# Patient Record
Sex: MALE | Race: WHITE
[De-identification: names, ages, dates, MRNs, and addresses within clinical notes are randomized per-mention and may not be internally consistent; named-entity substitution may affect disease eponyms.]

---

## 2017-09-21 NOTE — EDM.PDOC
ED HPI GENERAL MEDICAL PROBLEM





- General


Chief Complaint: General


Stated Complaint: restless legs


Time Seen by Provider: 09/21/17 02:45


Source of Information: Reports: Patient, Family


History Limitations: Reports: No Limitations





- History of Present Illness


INITIAL COMMENTS - FREE TEXT/NARRATIVE: 





67 YO WM presents to ER with history of chronic restless leg syndrome 

exacerbation. Pt was seen at pain clinic in [place] yesterday and had his 

oxycontin decreased from 40mg PO BID to 10mg TID and started on a new 

medication which pt and family haven't filled due to pharmacy being closed. Pt 

had prescription sent electronically and is unable to tell me the name of 

medication. Pt reports for the last 3 hours he has been unable to sleep or keep 

his legs still. At time of interview pt and family state his muscle relaxers 

must have "kicked in" because he is finally comfortable and is no longer 

requesting anything for his discomfort. Pt reports he is ready to go home to 

rest. Pt denies any complaints at this time. 


Duration: Chronic


Location: Reports: Back, Lower Extremity, Left, Lower Extremity, Right


Quality: Reports: Ache


Improves with: Reports: Movement


Worsens with: Reports: Rest


Associated Symptoms: Reports: No Other Symptoms


Treatments PTA: Reports: Other (see below)


  ** Lower Leg


Pain Score (Numeric/FACES): 6





- Related Data


 Allergies











Allergy/AdvReac Type Severity Reaction Status Date / Time


 


adhesive Allergy  Rash Verified 09/21/17 02:32


 


bacitracin Allergy  Rash Verified 09/21/17 02:32





[From Neosporin     





([id number])]     


 


bacitracin zinc Allergy  Rash Verified 09/21/17 02:32





[From Neosporin     





([id number])]     


 


neomycin sulfate Allergy  Rash Verified 09/21/17 02:32





[From Neosporin     





([id number])]     


 


polymyxin B Allergy  Rash Verified 09/21/17 02:32





[From Neosporin     





([id number])]     











Home Meds: 


 Home Meds





Acetaminophen [Tylenol] 1 - 2 tab PO Q4H PRN 08/27/15 [History]


Aspirin [Halfprin] 81 mg PO BID 08/27/15 [History]


Celecoxib [CeleBREX] 100 mg PO BID 08/27/15 [History]


DULoxetine [Cymbalta] 30 mg PO ACBREAKFAST 08/27/15 [History]


DULoxetine [Cymbalta] 60 mg PO BEDTIME 08/27/15 [History]


Lisinopril [Prinivil] 2 tab PO DAILY 08/27/15 [History]


Multivitamin with Minerals [Multiple Vitamin] 1 tab PO DAILY 08/27/15 [History]


Polyethylene Glycol 3350 [MiraLAX] 17 gm PO DAILY PRN 08/27/15 [History]


Ranitidine [Zantac] 150 mg PO BID 08/27/15 [History]


oxyCODONE ER [OxyCONTIN] 80 mg PO BID 08/27/15 [History]


tiZANidine [Zanaflex] 4 mg PO Q4H PRN 08/27/15 [History]


traZODone 100 mg PO BEDTIME 08/27/15 [History]











ED ROS GENERAL





- Review of Systems


Review Of Systems: See Below


Constitutional: Reports: No Symptoms


HEENT: Reports: No Symptoms


Respiratory: Reports: No Symptoms


Cardiovascular: Reports: No Symptoms


Endocrine: Reports: No Symptoms


GI/Abdominal: Reports: No Symptoms


: Reports: No Symptoms


Musculoskeletal: Reports: Back Pain, Leg Pain


Skin: Reports: No Symptoms


Neurological: Reports: No Symptoms


Psychiatric: Reports: No Symptoms


Hematologic/Lymphatic: Reports: No Symptoms


Immunologic: Reports: No Symptoms





ED EXAM, GENERAL





- Physical Exam


Exam: See Below


Exam Limited By: No Limitations


General Appearance: Alert, WD/WN, No Apparent Distress


Head: Atraumatic, Normocephalic


Neck: Normal Inspection, Supple, Non-Tender, Full Range of Motion


Respiratory/Chest: No Respiratory Distress, Lungs Clear, Normal Breath Sounds, 

No Accessory Muscle Use, Chest Non-Tender


Cardiovascular: Normal Peripheral Pulses, Regular Rate, Rhythm, No Edema, No 

Gallop, No JVD, No Murmur, No Rub


GI/Abdominal: Normal Bowel Sounds, Soft, Non-Tender, No Organomegaly, No 

Distention, No Abnormal Bruit, No Mass


Back Exam: Full Range of Motion, Muscle Spasm


Extremities: Normal Range of Motion, Non-Tender, No Pedal Edema, Normal 

Capillary Refill, Leg Pain


Neurological: Alert, Oriented, CN II-XII Intact, Normal Cognition, Normal Gait, 

Normal Reflexes, No Motor/Sensory Deficits


Psychiatric: Normal Affect, Normal Mood


Skin Exam: Warm, Dry, Intact, Normal Color, No Rash


Lymphatic: No Adenopathy





Course





- Vital Signs


Last Recorded V/S: 





 Last Vital Signs











Temp  36.3 C   09/21/17 02:39


 


Pulse  49 L  09/21/17 02:39


 


Resp  16   09/21/17 02:39


 


BP  111/61   09/21/17 02:39


 


Pulse Ox  95   09/21/17 02:39














Departure





- Departure


Time of Disposition: 03:02


Disposition: Home, Self-Care 01


Condition: Good


Clinical Impression: 


 Restless leg syndrome, controlled








- Discharge Information


Instructions:  Restless Legs Syndrome


Referrals: 


Merlyn Mota MD [Physician] - 





- Assessment/Plan


Assessment:: 





1. Chronic Restless Leg Syndrome


Plan: 





1. Discharge home


2. continue current pain mediciation regime


3. return to ER for worsening symptoms

## 2017-10-11 NOTE — EDM.PDOC
ED HPI GENERAL MEDICAL PROBLEM





- General


Stated Complaint: vomiting


Time Seen by Provider: 10/11/17 17:02


Source of Information: Reports: Patient, Family (wife)


History Limitations: Reports: No Limitations





- History of Present Illness


INITIAL COMMENTS - FREE TEXT/NARRATIVE: 





Patient presents with vomiting for 15-18 hours; at least 10 times.  He hasn't 

eaten or drank anything today.  He isn't sure what is causing it and doesn't 

get sick like this very often.  Other than generalized upset stomach he isn't 

having any pain.  He smokes 1 ppd.  He says this started during the night some 

time.  He hasn't taken his metoprolol today he says.


  ** Right Abdomen


Pain Score (Numeric/FACES): 8





- Related Data


 Allergies











Allergy/AdvReac Type Severity Reaction Status Date / Time


 


adhesive Allergy  Rash Verified 09/21/17 02:32


 


bacitracin Allergy  Rash Verified 09/21/17 02:32





[From Neosporin     





([id number])]     


 


bacitracin zinc Allergy  Rash Verified 09/21/17 02:32





[From Neosporin     





([id number])]     


 


neomycin sulfate Allergy  Rash Verified 09/21/17 02:32





[From Neosporin     





([id number])]     


 


polymyxin B Allergy  Rash Verified 09/21/17 02:32





[From Neosporin     





([id number])]     











Home Meds: 


 Home Meds





Acetaminophen [Tylenol] 1 - 2 tab PO Q4H PRN 08/27/15 [History]


Aspirin [Halfprin] 81 mg PO BID 08/27/15 [History]


Celecoxib [CeleBREX] 100 mg PO BID 08/27/15 [History]


DULoxetine [Cymbalta] 30 mg PO ACBREAKFAST 08/27/15 [History]


DULoxetine [Cymbalta] 60 mg PO BEDTIME 08/27/15 [History]


Multivitamin with Minerals [Multiple Vitamin] 1 tab PO DAILY 08/27/15 [History]


Polyethylene Glycol 3350 [MiraLAX] 17 gm PO DAILY PRN 08/27/15 [History]


Ranitidine [Zantac] 150 mg PO BID 08/27/15 [History]


tiZANidine [Zanaflex] 4 mg PO Q4H PRN 08/27/15 [History]


traZODone 100 mg PO BEDTIME 08/27/15 [History]


Metoprolol Succinate [Toprol Xl] 50 mg PO BID 10/11/17 [History]


oxyCODONE ER [OxyCONTIN] 10 mg PO TID 10/11/17 [History]











Past Medical History


HEENT History: Reports: Cataract, Hard of Hearing, Impaired Vision, Other (See 

Below)


Other HEENT History: ocular muscular dystrophy


Cardiovascular History: Reports: Hypertension


Respiratory History: Reports: Pneumonia, Recurrent, Other (See Below)


Other Respiratory History: heavy smoker


Gastrointestinal History: Reports: Chronic Constipation, GERD, Other (See Below)


Other Gastrointestinal History: ulcer


Musculoskeletal History: Reports: Back Pain, Chronic, Muscular Dystrophy


Psychiatric History: Reports: Anxiety, Depression





- Infectious Disease History


Infectious Disease History: Reports: Measles, Mumps





- Past Surgical History


GI Surgical History: Reports: Nissen Fundoplication


Neurological Surgical History: Reports: Discectomy


Dermatological Surgical History: Reports: Skin Graft





Social & Family History





- Tobacco Use


Smoking Status *Q: Current Every Day Smoker


Years of Tobacco use: 50


Packs/Tins Daily: 1





- Caffeine Use


Caffeine Use: Reports: None





- Recreational Drug Use


Recreational Drug Use: No





ED ROS GENERAL





- Review of Systems


Review Of Systems: See Below


Constitutional: Denies: Fever, Chills


HEENT: Denies: Throat Pain, Vision Change


Respiratory: Denies: Shortness of Breath


Cardiovascular: Denies: Chest Pain, Lightheadedness, Syncope


GI/Abdominal: Reports: Nausea, Vomiting.  Denies: Constipation, Diarrhea, 

Hematemesis, Hematochezia


: Denies: Dysuria


Musculoskeletal: Reports: No Symptoms


Skin: Denies: Cyanosis, Jaundice, Mottled, Pallor, Diaphoresis


Neurological: Denies: Confusion, Dizziness, Headache, Seizure, Syncope


Psychiatric: Denies: Agitation, Anxiety, Confusion





ED EXAM, GI/ABD





- Physical Exam


Exam: See Below


Exam Limited By: No Limitations


General Appearance: Alert, WD/WN, No Apparent Distress


Eyes: Bilateral: Normal Appearance, EOMI


Ears: Normal External Exam, Hearing Grossly Normal


Nose: Normal Inspection, No Blood


Throat/Mouth: Normal Lips, Normal Voice, No Airway Compromise, Other (a little 

dry)


Head: Atraumatic, Normocephalic


Neck: Normal Inspection, Supple, Non-Tender, Full Range of Motion.  No: Carotid 

Bruit


Respiratory/Chest: No Respiratory Distress, Lungs Clear, Normal Breath Sounds, 

No Accessory Muscle Use


Cardiovascular: Regular Rate, Rhythm, No Murmur, Other (pedal pulses a little 

weak)


GI/Abdominal Exam: Normal Bowel Sounds, Soft, Non-Tender, No Organomegaly, No 

Distention


Back Exam: Normal Inspection, Full Range of Motion.  No: CVA Tenderness (L), 

CVA Tenderness (R)


Extremities: Normal Inspection, Normal Range of Motion, Non-Tender, No Pedal 

Edema


Neurological: Alert, Oriented, Normal Cognition, No Motor/Sensory Deficits


Psychiatric: Normal Affect, Normal Mood


Skin Exam: Warm, Dry, Intact, No Rash, Pallor (mild subjectively)





Course





- Vital Signs


Last Recorded V/S: 


 Last Vital Signs











Temp  97.9 F   10/11/17 17:41


 


Pulse  46 L  10/11/17 17:41


 


Resp  18   10/11/17 17:41


 


BP  233/88 H  10/11/17 17:55


 


Pulse Ox  95   10/11/17 17:41














- Orders/Labs/Meds


Orders: 


 Active Orders 24 hr











 Category Date Time Status


 


 Patient Status Manage Transfer [TRANSFER] Routine ADT  10/11/17 21:12 Ordered


 


 Patient Status [ADT] Routine ADT  10/11/17 21:11 Ordered


 


 EKG Documentation Completion [RC] ASDIRECTED Care  10/11/17 21:11 Active


 


 Abdomen Pelvis w Cont [CT] Stat Exams  10/11/17 18:35 Taken


 


 Ondansetron [Zofran ODT] Med  10/11/17 20:01 Active





 4 mg PO Q8H PRN   


 


 Sodium Chloride 0.9% [Normal Saline] Med  10/11/17 19:30 Active





 50 ml FLUSH ASDIRECTED   


 


 Tamsulosin [Flomax] Med  10/11/17 20:15 Active





 0.4 mg PO BIDPC   


 


 oxyCODONE Med  10/11/17 20:03 Active





 10 mg PO Q6H PRN   


 


 EKG 12 Lead [EK] Routine Ther  10/11/17 21:10 Ordered








 Medication Orders





Ondansetron HCl (Zofran Odt)  4 mg PO Q8H PRN


   PRN Reason: Nausea/Vomiting


Oxycodone HCl (Oxycodone)  10 mg PO Q6H PRN


   PRN Reason: Pain


Sodium Chloride (Normal Saline)  50 ml FLUSH ASDIRECTED CARLOS


Tamsulosin HCl (Flomax)  0.4 mg PO BIDPC CARLOS


   Stop: 10/13/17 09:01








Labs: 


 Laboratory Tests











  10/11/17 10/11/17 10/11/17 Range/Units





  17:15 17:15 17:25 


 


WBC  10.3 H    (5.0-10.0)  10^3/uL


 


RBC  5.24    (4.50-6.00)  10^6/uL


 


Hgb  15.4    (13.0-17.0)  g/dL


 


Hct  49.2    (40.0-52.0)  %


 


MCV  93.8 H    (82.0-92.0)  fL


 


MCH  29.4    (27.0-31.0)  pg


 


MCHC  31.3 L    (32.0-36.0)  g/dL


 


RDW  14.0    (11.5-14.5)  %


 


Plt Count  229    (150-300)  10^3/uL


 


MPV  7.9    (7.4-10.4)  fL


 


Neut % (Auto)  85.5 H    (50.0-70.0)  %


 


Lymph % (Auto)  10.9 L    (20.0-40.0)  %


 


Mono % (Auto)  3.4    (2.0-8.0)  %


 


Eos % (Auto)  0.2 L    (1.0-3.0)  %


 


Baso % (Auto)  0.0    (0.0-1.0)  %


 


Neut # (Auto)  8.8 H    (2.5-7.0)  10^3/uL


 


Lymph # (Auto)  1.1    (1.0-4.0)  10^3/uL


 


Mono # (Auto)  0.4    (0.1-0.8)  10^3/uL


 


Eos # (Auto)  0.0 L    (0.1-0.3)  10^3/uL


 


Baso # (Auto)  0.0    (0.0-0.1)  10^3/uL


 


Sodium   138   (136-145)  mmol/L


 


Potassium   4.6   (3.3-5.3)  mmol/L


 


Chloride   100   ()  mmol/L


 


Carbon Dioxide   25.7   (21.0-32.0)  mmol/L


 


BUN   22   (6-25)  mg/dL


 


Creatinine   1.26 H   (0.51-1.17)  mg/dL


 


Est Cr Clr Drug Dosing   62.05   mL/min


 


Estimated GFR (MDRD)   57   mL/min


 


Glucose   156 H   ()  mg/dL


 


Calcium   9.6   (8.7-10.3)  mg/dL


 


Total Bilirubin   0.5   (0.2-1.0)  mg/dL


 


AST   16   (15-37)  U/L


 


ALT   18   (12-78)  U/L


 


Alkaline Phosphatase   92   ()  IU/L


 


Total Protein   8.4 H   (6.4-8.2)  g/dL


 


Albumin   3.84   (3.00-4.80)  g/dL


 


Specimen Type    Urinvoid  


 


Urine Color    Yellow  (YELLOW)  


 


Urine Appearance    Clear  (CLEAR)  


 


Urine pH    7.5  (5.0-9.0)  


 


Ur Specific Gravity    1.020  (1.005-1.030)  


 


Urine Protein    100 H  (NEGATIVE)  mg/dL


 


Urine Glucose (UA)    100 H  (NEGATIVE)  mg/dL


 


Urine Ketones    Negative  (NEGATIVE)  mg/dL


 


Urine Occult Blood    Small H  (NEGATIVE)  


 


Urine Nitrite    Negative  (NEGATIVE)  


 


Urine Bilirubin    Negative  (NEGATIVE)  


 


Urine Urobilinogen    0.2  (0.2-1.0)  E.U./dL


 


Ur Leukocyte Esterase    Negative  (NEGATIVE)  


 


Urine RBC     H  /HPF


 


Urine WBC    0-5  /HPF


 


Ur Epithelial Cells    Few  /LPF


 


Urine Bacteria    Few  (NONE TO FEW)  /HPF











Meds: 


Medications











Generic Name Dose Route Start Last Admin





  Trade Name Freq  PRN Reason Stop Dose Admin


 


Ondansetron HCl  4 mg  10/11/17 20:01  





  Zofran Odt  PO   





  Q8H PRN   





  Nausea/Vomiting   


 


Oxycodone HCl  10 mg  10/11/17 20:03  





  Oxycodone  PO   





  Q6H PRN   





  Pain   


 


Sodium Chloride  50 ml  10/11/17 19:30  





  Normal Saline  FLUSH   





  ASDIRECTED CARLOS   


 


Tamsulosin HCl  0.4 mg  10/11/17 20:15  





  Flomax  PO  10/13/17 09:01  





  BIDPC CARLOS   














Discontinued Medications














Generic Name Dose Route Start Last Admin





  Trade Name Freq  PRN Reason Stop Dose Admin


 


Clonidine HCl  0.2 mg  10/11/17 20:38  





  Catapres  PO  10/11/17 20:39  





  ONETIME ONE   


 


Hydromorphone HCl  0.5 mg  10/11/17 17:48  10/11/17 17:58





  Dilaudid  IVPUSH  10/11/17 17:49  0.5 mg





  ONETIME ONE   Administration


 


Hydromorphone HCl  1 mg  10/11/17 18:34  10/11/17 18:56





  Dilaudid  IVPUSH  10/11/17 18:35  1 mg





  ONETIME ONE   Administration


 


Sodium Chloride  1,000 mls @ 999 mls/hr  10/11/17 17:13  10/11/17 17:30





  Normal Saline  IV  10/11/17 18:13  999 mls/hr





  .BOLUS ONE   Administration


 


Iopamidol  75 ml  10/11/17 19:18  





  Isovue-300 (61%)  IV  10/11/17 19:19  





  ONETIME ONE   


 


Ondansetron HCl  4 mg  10/11/17 17:13  10/11/17 17:30





  Zofran  IVPUSH  10/11/17 17:14  4 mg





  ONETIME ONE   Administration


 


Ondansetron HCl  8 mg  10/11/17 19:58  





  Zofran  IVPUSH  10/11/17 19:59  





  ONETIME ONE   


 


Oxycodone HCl  Confirm  10/11/17 20:11  





  Oxycodone  Administered  10/11/17 20:12  





  Dose   





  40 mg   





  .ROUTE   





  .STK-MED ONE   


 


Tamsulosin HCl  0.4 mg  10/11/17 19:58  





  Flomax  PO  10/11/17 19:59  





  ONETIME ONE   


 


Tamsulosin HCl  Confirm  10/11/17 20:10  





  Flomax  Administered  10/11/17 20:11  





  Dose   





  0.4 mg   





  .ROUTE   





  .STK-MED ONE   














- Re-Assessments/Exams


Free Text/Narrative Re-Assessment/Exam: 





10/11/17 17:49


Pt has a pain in LLQ that is getting more and more uncomfortable.  He thinks it 

is from the vomiting and retching.  He takes Celebrex so cannot have Toradol; 

will give Dilaudid.


10/11/17 18:36


The pain subsided briefly with the 0.5 mg Dilaudid but is back.  BP is down a 

little but still high.  Hoping to get that down with pain control initially.  

Will give Dilaudid 1 mg and get a CT.


10/11/17 18:47


BP slowly coming down; was 244, then 233 now 220 systolically.


10/11/17 20:08


CT shows two stones in distal left ureter measuring 2 mm and 3-4 mm per report.

  Patient is still rating pain at 7/10 down from a high of 8/10.  He appears 

much more comfortable than he did when he rated pain at 8/10.


10/11/17 20:39


BP is down to 202/105.  Will give Clonidine rather than his metoprolol with his 

bradycardia.


10/11/17 21:19


Discussed case with Dr. Maldonado and we decided to keep pt for observation so 

that he can be monitored with his bradycardia, uncontrolled hypertension and 

the uncontrolled kidney stone pain (although the patient appears quite 

comfortable after the Dilaudid, he still says it feels "sore" there and rates 

it at 7).  The Flomax and Zofran were also given as well as IR oxycodone 10 mg 

po.  Patient stable on admission.





Departure





- Departure


Time of Disposition: 21:17


Disposition: Refer to Observation


Condition: Good


Clinical Impression: 


 Calculus of distal left ureter, Bradycardia with 41-50 beats per minute





Hypertension


Qualifiers:


 Hypertension type: unspecified Qualified Code(s): I10 - Essential (primary) 

hypertension








- Discharge Information


Referrals: 


Merlyn Mota MD [Primary Care Provider] - 





- My Orders


Last 24 Hours: 


My Active Orders





10/11/17 18:35


Abdomen Pelvis w Cont [CT] Stat 





10/11/17 19:30


Sodium Chloride 0.9% [Normal Saline]   50 ml FLUSH ASDIRECTED 





10/11/17 20:01


Ondansetron [Zofran ODT]   4 mg PO Q8H PRN 





10/11/17 20:03


oxyCODONE   10 mg PO Q6H PRN 





10/11/17 20:15


Tamsulosin [Flomax]   0.4 mg PO BIDPC 





10/11/17 21:10


EKG 12 Lead [EK] Routine 





10/11/17 21:11


Patient Status [ADT] Routine 


EKG Documentation Completion [RC] ASDIRECTED 





10/11/17 21:12


Patient Status Manage Transfer [TRANSFER] Routine 














- Assessment/Plan


Last 24 Hours: 


My Active Orders





10/11/17 18:35


Abdomen Pelvis w Cont [CT] Stat 





10/11/17 19:30


Sodium Chloride 0.9% [Normal Saline]   50 ml FLUSH ASDIRECTED 





10/11/17 20:01


Ondansetron [Zofran ODT]   4 mg PO Q8H PRN 





10/11/17 20:03


oxyCODONE   10 mg PO Q6H PRN 





10/11/17 20:15


Tamsulosin [Flomax]   0.4 mg PO BIDPC 





10/11/17 21:10


EKG 12 Lead [EK] Routine 





10/11/17 21:11


Patient Status [ADT] Routine 


EKG Documentation Completion [RC] ASDIRECTED 





10/11/17 21:12


Patient Status Manage Transfer [TRANSFER] Routine

## 2017-10-12 NOTE — PCM.HP
H&P History of Present Illness





- General


Date of Service: 10/12/17


Admit Problem/Dx: 


Renal calculi, hypertensive urgency, bradycardia


Source of Information: Patient, Old Records


History Limitations: Reports: No Limitations





- History of Present Illness


Initial Comments - Free Text/Narative: 


Mr. Lee reported to the ED last evening following recurrent emesis for 15

-18 hours prior to arrival at [facility]. Along with this, he complained of left 

abdominal and side pain, anorexia, and generalized malaise. He denied having 

any headache, chest pain, or shortness of breath. He had been taking his home 

medications as prescribed, including the recently increased dose of metoprolol 

succinate 100mg BID, lisinopril 40mg daily, and several medications for chronic 

pain for which he is followed at [facility]. 





ED COURSE: In the ED on the evening of 10/11/17, vital signs were notable for a 

BP of 233/88 and pulse of 46. Work-up performed showed mild leukocytosis, 

mildly elevated creatinine, hematuria, and CT abdomen/pelvis with two renal 

calculi in the distal left ureter measuring 2mm and 3-4mm, mild to moderate 

left hydrouretonephrosis, and other chronic findings. Despite IV narcotics with 

some improvement in pain control and administration of clonidine, he continued 

to have ongoing pain he rated at 7/10, elevated BPs with SBPs >180, and 

bradycardia in the 40s. I was consulted by ED provider Tyrell Taylor PA-C, and 

after discussion, had him obtain an EKG which showed sinus bradycardia. Due to 

ongoing pain likely related to renal calculi, hypertensive urgency, and 

bradycardia, agreed to admit him for observation for VS and cardiac monitoring, 

breakthrough pain management with IV pain medications, and IVF.





This morning, Mr. Lee reports improvement in pain with use of IV 

hydromorphone, but continues to have left abdominal and side pain. Has yet to 

pass any stones. Nausea improved, but poor appetite. No specific aggravating 

factors for pain, but eating has made his nausea worse. Had a fever during the 

night, but doesn't have any current fever or chills. Denies other symptoms, per 

ROS.





  ** Right Abdomen


Pain Score (Numeric/FACES): 6





  ** Bilateral Leg


Pain Score (Numeric/FACES): 7





  ** Left Back


Pain Score (Numeric/FACES): 6





- Related Data


Allergies/Adverse Reactions: 


 Allergies











Allergy/AdvReac Type Severity Reaction Status Date / Time


 


adhesive Allergy  Rash Verified 10/11/17 22:25


 


bacitracin Allergy  Rash Verified 10/11/17 22:25





[From Neosporin     





([id number])]     


 


bacitracin zinc Allergy  Rash Verified 10/11/17 22:25





[From Neosporin     





([id number])]     


 


neomycin sulfate Allergy  Rash Verified 10/11/17 22:25





[From Neosporin     





([id number])]     


 


polymyxin B Allergy  Rash Verified 10/11/17 22:25





[From Neosporin     





([id number])]     











Home Medications: 


 Home Meds





Acetaminophen [Tylenol] 325 - 650 mg PO Q4H PRN 08/27/15 [History]


Aspirin [Halfprin] 81 mg PO BID 08/27/15 [History]


Celecoxib [CeleBREX] 100 mg PO BID 08/27/15 [History]


DULoxetine [Cymbalta] 30 mg PO ACBREAKFAST 08/27/15 [History]


DULoxetine [Cymbalta] 60 mg PO BEDTIME 08/27/15 [History]


Multivitamin with Minerals [Multiple Vitamin] 1 tab PO DAILY 08/27/15 [History]


Polyethylene Glycol 3350 [MiraLAX] 17 gm PO DAILY PRN 08/27/15 [History]


Ranitidine [Zantac] 150 mg PO BID 08/27/15 [History]


tiZANidine [Zanaflex] 4 - 8 mg PO Q4H PRN 08/27/15 [History]


traZODone 100 mg PO BEDTIME 08/27/15 [History]


oxyCODONE ER [OxyCONTIN] 10 mg PO TID 10/11/17 [History]


Gabapentin [Neurontin] 200 mg PO BEDTIME 10/12/17 [History]


Lisinopril 40 mg PO DAILY 10/12/17 [History]


Metoprolol Succinate [Toprol Xl] 50 mg PO BID #0 10/12/17 [Rx]


Pramipexole Di-HCl [Mirapex] 0.125 - 0.25 mg PO BEDTIME 10/12/17 [History]


Tamsulosin [Flomax] 0.4 mg PO DAILY #7 cap.er 10/12/17 [Rx]


diphenhydrAMINE HCl [Benadryl] 25 mg PO TID 10/12/17 [History]


oxyCODONE 5 - 10 mg PO Q4H PRN #20 tablet 10/12/17 [Rx]











Past Medical History


HEENT History: Reports: Cataract, Hard of Hearing, Impaired Vision, Other (See 

Below)


Other HEENT History: ocular muscular dystrophy


Cardiovascular History: Reports: Hypertension


Respiratory History: Reports: Pneumonia, Recurrent, Other (See Below)


Other Respiratory History: heavy smoker


Gastrointestinal History: Reports: Chronic Constipation, GERD, Other (See Below)


Other Gastrointestinal History: ulcer


Musculoskeletal History: Reports: Back Pain, Chronic, Muscular Dystrophy


Neurological History: Reports: None


Psychiatric History: Reports: Anxiety, Depression





- Infectious Disease History


Infectious Disease History: Reports: Measles, Mumps





- Past Surgical History


HEENT Surgical History: Reports: None


Cardiovascular Surgical History: Reports: None


GI Surgical History: Reports: Appendectomy, Cholecystectomy, Colonoscopy, 

Nissen Fundoplication


Neurological Surgical History: Reports: Discectomy


Dermatological Surgical History: Reports: Skin Graft





Social & Family History





- Family History


Family Medical History: Noncontributory





- Tobacco Use


Smoking Status *Q: Current Every Day Smoker


Years of Tobacco use: 50


Packs/Tins Daily: 1


Used Tobacco, but Quit: No


Second Hand Smoke Exposure: Yes





- Caffeine Use


Caffeine Use: Reports: None





- Recreational Drug Use


Recreational Drug Use: No





H&P Review of Systems





- Review of Systems:


Review Of Systems: See Below


General: Reports: Fever (overnight), Malaise, Weakness, Fatigue, Decreased 

Appetite


HEENT: Denies: Headaches, Sinus Congestion, Sore Throat, Visual Changes


Pulmonary: Denies: Shortness of Breath, Wheezing, Cough


Cardiovascular: Reports: Lightheadedness.  Denies: Chest Pain, Palpitations, 

Syncope


Gastrointestinal: Reports: Abdominal Pain, Anorexia, Nausea.  Denies: Black 

Stool, Bloody Stool, Constipation, Diarrhea


Genitourinary: Denies: Dysuria, Frequency, Burning, Pain, Urgency, Hematuria


Musculoskeletal: Reports: Back Pain (chronic low back pain), Other


Skin: Denies: Rash


Psychiatric: Denies: Confusion, Depression, Anxiety


Neurological: Reports: Weakness (generalized).  Denies: Dizziness, Headache





Exam





- Exam


Exam: See Below





- Vital Signs


Vital Signs: 


 Last Vital Signs











Temp  37.2 C   10/12/17 14:15


 


Pulse  56 L  10/12/17 14:15


 


Resp  16   10/12/17 14:15


 


BP  174/82 H  10/12/17 14:15


 


Pulse Ox  95   10/12/17 14:15











Weight: 79.549 kg





- Exam


Physical Exam Comments:: 


GENERAL: Uncomfortable-appearing elderly male appearing older than stated age 

in no acute distress.


HEENT: Normocephalic, atraumatic.  Conjunctiva clear.  Nares patent without 

discharge.  Mucous membranes moist, posterior pharynx unremarkable.


NECK: Supple, no masses.


CV: Bradycardia, regular rhythm, no murmurs, rubs, or gallops.  2+ radial 

pulses.


PULMONARY: Normal effort, clear to auscultation bilaterally, no wheezes, rales, 

or rhonchi.


ABDOMEN: Positive bowel sounds, soft, left mid-abdominal, side, and flank 

tenderness to palpation and percussion, nondistended.


EXTREMITIES: No edema, cyanosis, or clubbing.


MUSCULOSKELETAL: Moves all extremities well.


NEUROLOGICAL: No obvious deficits.


DERMATOLOGIC: No rashes or suspicious lesions in exposed areas.


PSYCHIATRIC: Alert, interactive, appropriate affect.





- Patient Data


Lab Results Last 24 hrs: 


 Laboratory Results - last 24 hr











  10/12/17 10/12/17 Range/Units





  08:58 08:58 


 


WBC  11.2 H   (5.0-10.0)  10^3/uL


 


RBC  4.83   (4.50-6.00)  10^6/uL


 


Hgb  14.9   (13.0-17.0)  g/dL


 


Hct  45.6   (40.0-52.0)  %


 


MCV  94.4 H   (82.0-92.0)  fL


 


MCH  30.8   (27.0-31.0)  pg


 


MCHC  32.7   (32.0-36.0)  g/dL


 


RDW  13.5   (11.5-14.5)  %


 


Plt Count  211   (150-300)  10^3/uL


 


MPV  7.8   (7.4-10.4)  fL


 


Neut % (Auto)  79.7 H   (50.0-70.0)  %


 


Lymph % (Auto)  10.9 L   (20.0-40.0)  %


 


Mono % (Auto)  9.3 H   (2.0-8.0)  %


 


Eos % (Auto)  0.1 L   (1.0-3.0)  %


 


Baso % (Auto)  0.0   (0.0-1.0)  %


 


Neut # (Auto)  9.0 H   (2.5-7.0)  10^3/uL


 


Lymph # (Auto)  1.2   (1.0-4.0)  10^3/uL


 


Mono # (Auto)  1.0 H   (0.1-0.8)  10^3/uL


 


Eos # (Auto)  0.0 L   (0.1-0.3)  10^3/uL


 


Baso # (Auto)  0.0   (0.0-0.1)  10^3/uL


 


Sodium   137  (136-145)  mmol/L


 


Potassium   4.0  (3.3-5.3)  mmol/L


 


Chloride   102  ()  mmol/L


 


Carbon Dioxide   23.6  (21.0-32.0)  mmol/L


 


BUN   21  (6-25)  mg/dL


 


Creatinine   1.29 H  (0.51-1.17)  mg/dL


 


Est Cr Clr Drug Dosing   62.52  mL/min


 


Estimated GFR (MDRD)   56  mL/min


 


Glucose   123 H  ()  mg/dL


 


Calcium   8.7  (8.7-10.3)  mg/dL


 


C-Reactive Protein   2.3 H  (0.0-0.9)  mg/dL











Result Diagrams: 


 10/12/17 08:58





 10/12/17 08:58





*Q Meaningful Use (ADM)





- VTE *Q


VTE Criteria *Q: 








- Stroke *Q


Stroke Criteria *Q: 








- AMI *Q


AMI Criteria *Q: 





Problem List Initiated/Reviewed/Updated: Yes


Orders Last 24hrs: 


 Active Orders 24 hr











 Category Date Time Status


 


 Patient Status [ADT] Routine ADT  10/11/17 22:20 Ordered


 


 Intake and Output [RC] 1400,2200,0600 Care  10/11/17 22:22 Active


 


 Notify Provider Vital Signs [RC] ASDIRECTED Care  10/11/17 22:22 Active


 


 Oxygen Therapy [RC] ASDIRECTED Care  10/11/17 22:29 Active


 


 Ready for Discharge [RC] PER UNIT ROUTINE Care  10/12/17 12:46 Inactive


 


 Strain Urine [RC] ASDIRECTED Care  10/11/17 22:27 Active


 


 Telemetry Monitoring [Cardiac Monitoring] [RC] 0300, Care  10/11/17 21:25 

Active





 0700,1100,1500,1900,2300   


 


 Up ad Lety [RC] ASDIRECTED Care  10/11/17 22:22 Active


 


 Vital Signs [RC] 0300,0700,1100,1500,1900,2300 Care  10/11/17 22:20 Active


 


 Regular Diet [DIET] Diet  10/12/17 Breakfast Active


 


 Gabapentin [Neurontin] Med  10/12/17 21:00 Active





 200 mg PO BEDTIME   


 


 HYDROmorphone [Dilaudid] Med  10/11/17 22:28 Active





 1 mg IVPUSH Q4H PRN   


 


 Labetalol [Normodyne] Med  10/11/17 22:45 Active





 See Dose Instructions  IVPUSH Q4H PRN   


 


 Lisinopril [Prinivil] Med  10/12/17 09:15 Active





 40 mg PO DAILY   


 


 Metoprolol Succinate [Toprol XL] Med  10/13/17 09:00 Active





 50 mg PO DAILY   


 


 Nicotine [Habitrol] Med  10/11/17 22:35 Active





 21 mg TRDERM DAILY PRN   


 


 Ondansetron [Zofran] Med  10/11/17 22:26 Active





 4 mg IVPUSH Q6H PRN   


 


 Pramipexole [Mirapex] Med  10/12/17 21:00 Active





 0.125 - 0.25 mg PO BEDTIME   


 


 Sodium Chloride 0.9% [Normal Saline] 1,000 ml Med  10/11/17 22:04 Active





 IV ASDIRECTED   


 


 diphenhydrAMINE [Benadryl] Med  10/12/17 09:20 Active





 25 mg PO TID PRN   


 


 oxyCODONE Med  10/12/17 09:18 Active





 5 - 10 mg PO Q4H PRN   


 


 Code Status [Resuscitation Status] Routine Resus Stat  10/12/17 09:18 Ordered








 Medication Orders





Acetaminophen (Tylenol)  325 - 650 mg PO Q4H PRN


   PRN Reason: Pain


Aspirin (Halfprin)  81 mg PO BID [facility]


   Last Admin: 10/12/17 08:32  Dose: 81 mg


Celecoxib (Celebrex)  100 mg PO BID [facility]


   Last Admin: 10/12/17 08:31  Dose: 100 mg


Diphenhydramine HCl (Benadryl)  25 mg PO TID PRN


   PRN Reason: Itching


Duloxetine HCl (Cymbalta)  30 mg PO ACBREAKFAST [facility]


   Last Admin: 10/12/17 07:51  Dose: 30 mg


Duloxetine HCl (Cymbalta)  60 mg PO BEDTIME [facility]


Gabapentin (Neurontin)  200 mg PO BEDTIME [facility]


Hydromorphone HCl (Dilaudid)  1 mg IVPUSH Q4H PRN


   PRN Reason: Pain (severe 7-10)


   Last Admin: 10/12/17 05:52  Dose: 1 mg


Sodium Chloride (Normal Saline)  1,000 mls @ 125 mls/hr IV ASDIRECTED [facility]


   Last Admin: 10/12/17 13:59  Dose: 125 mls/hr


   Infusion: 10/12/17 13:59  Dose: 125 mls/hr


   Admin: 10/12/17 06:30  Dose: 125 mls/hr


   Infusion: 10/12/17 06:28  Dose: 125 mls/hr


   Admin: 10/11/17 22:28  Dose: 125 mls/hr


Labetalol HCl (Normodyne)  0 mg IVPUSH Q4H PRN; Protocol


   PRN Reason: Other


Lisinopril (Prinivil)  40 mg PO DAILY [facility]


   Last Admin: 10/12/17 09:38  Dose: 40 mg


Metoprolol Succinate (Toprol Xl)  50 mg PO DAILY [facility]


Multivitamins/Minerals (Centrum)  1 tab PO DAILY [facility]


   Last Admin: 10/12/17 08:31  Dose: 1 tab


Nicotine (Habitrol)  21 mg TRDERM DAILY PRN


   PRN Reason: craving


Ondansetron HCl (Zofran)  4 mg IVPUSH Q6H PRN


   PRN Reason: Nausea/Vomiting


Oxycodone HCl (Oxycontin)  10 mg PO TID [facility]


   Last Admin: 10/12/17 13:59  Dose: 10 mg


   Admin: 10/12/17 08:32  Dose: 10 mg


Oxycodone HCl (Oxycodone)  5 - 10 mg PO Q4H PRN


   PRN Reason: Pain


   Last Admin: 10/12/17 12:39  Dose: 10 mg


Polyethylene Glycol (Miralax)  17 gm PO DAILY PRN


   PRN Reason: Constipation


Pramipexole Dihydrochloride (Mirapex)  0.125 - 0.25 mg PO BEDTIME [facility]


Ranitidine HCl (Zantac)  150 mg PO BID [facility]


   Last Admin: 10/12/17 08:34  Dose: 150 mg


[facility]  [facility]


   Stop: 10/13/17 09:01


   Last Admin: 10/12/17 18:49  Dose: 0.4 mg


   Admin: 10/12/17 09:38  Dose: 0.4 mg


   Admin: 10/12/17 03:17 Dose:  Not Given


Tizanidine HCl (Zanaflex)  4 mg PO Q4H PRN


   PRN Reason: Muscle Spasm


   Last Admin: 10/12/17 14:10  Dose: 4 mg


   Admin: 10/12/17 06:03  Dose: 4 mg


Trazodone HCl (Trazodone)  100 mg PO BEDTIME [facility]








Assessment/Plan Comment:: 


66yoM with history notable for HTN, GERD, chronic pain, depresison, and tobacco 

dependence admitted for pain related to renal calculi, hypertensive urgency, 

and bradycardia from the ED on the evening of 10/11/17.





# Renal calculi: Presentation of left sided pain, nausea, and anorexia 

consistent with renal colic. Two sub-4mm stones in distal left ureter without 

known passage thus far. Continue pain control with prn IV hydromorphone and 

will also start some prn oxycodone to provide longer control, in addition to 

his baseline regimen of pain medications. Continue tamsulosin and IVF. Continue 

straining urine with plan to collect stone for analysis.





# Hypertensive urgency and bradycardia: BPs severely elevated on initial 

presentation and remained with SBPs in the 200s despite clonidine. Outpatient 

charting shows inconsistent antihypertensive use and recent increase in 

metoprolol succinate to 100mg BID, likely causing bradycardia with concominant 

hypertension. Asymptomatic without evidence of end organ damage. BPs improved 

overnight to <180/110 without additional use of prn IV labetalol. Continue 

lisinopril 40mg daily. Resume metoprolol succinate at reduced dose of 50mg BID 

tomorrow. Plan to start HCTZ as an outpatient, as further acute blood pressure 

lowering in the initial 24 hours is not indicated.





# Elevated creatinine: Baseline creatinine around 1. Current creatinine 1.29, 

stable from admission at 1.26, consistent with some volume depletion and 

obstruction from renal calculi, but not diagnostic for acute kidney. Continue 

IVF and recheck tomorrow.





# Leukocytosis: Mildly increased WBC and neutrophilia with mild fever this 

morning. No symptoms of infectious process and exam nonfocal. Continue 

monitoring.





Chronic conditions:


# GERD: Continue PPI.


# Chronic low back pain: Continue home regimen for which he is followed by 

[facility].


# Insomnia: Continue trazodone. 


# Tobacco dependence. Nicotine patch prn. 





Hospitalization details:


# FEN: NS at 125cc/hr. Electrolytes normal; recheck tomorrow. Regular diet.


# PPX: Moderate risk for DVT=enoxaparin.


# Code status: Full.


# Emergency contact: Wife. 


# Disposition: Continue on observation with anticipated discharge to home 

tomorrow morning pending improved pain control and blood pressure stabilization 

without worsening renal function or evidence of infection.

## 2017-10-13 NOTE — PCM.DCSUM1
**Discharge Summary





- Hospital Course


Free Text/Narrative:: 


66yoM with history notable for HTN, GERD, chronic pain, depresison, and tobacco 

dependence admitted for pain related to renal calculi, hypertensive urgency, 

and bradycardia from the ED on the evening of 10/11/17.





He had interval improvement in flank pain and blood pressures during his stay 

and was deemed ready for discharge on 10/13/17. He will follow-up in 3 days in 

the [facility] to reassess. Follow-up recommendations underlined below in 

problem-based hospital course: 





# Renal calculi: Presentation of left sided pain, nausea, and anorexia 

consistent with renal colic. Two sub-4mm stones in distal left ureter noted on 

CT abdomen/pelvis. Tamsulosin and IVF started. Provided acute pain control with 

prn IV hydromorphone and oxycodone, in addition to his baseline regimen of pain 

medications. No stone was known to be passed, but thought to have likely made 

it into the bladder as he had improvement in his pain with no use of prn 

medications in the last 8 hours of hospitalization. Recommended continued use 

of tamsulosin and increased fluid intake until stone passage. Provided supplies 

to continue straining urine and to bring into clinic for analysis once passed. 

Likely calcium carbonate stone, of which recurrence will be minimized by 

initiation of HCTZ, primarily for BP control, as below.





# Hypertensive urgency and bradycardia: BPs severely elevated on initial 

presentation and remained with SBPs in the 200s despite clonidine. Outpatient 

charting shows inconsistent antihypertensive use and recent increase in 

metoprolol succinate to 100mg BID, likely causing bradycardia with concominant 

hypertension. Asymptomatic without evidence of end organ damage. BPs improved 

throughout stay to <180/110. Continued lisinopril 40mg daily. Resumed 

metoprolol succinate at reduced dose of 50mg BID on the day of discharge. 

Provided rx for him to start HCTZ 12.5mg the day after discharge. Ongoing 

titration of medications will be necessary to optimize BP control. 





# Elevated creatinine: Baseline creatinine around 1, though this was >2 years 

ago, and 1.29 on admission consistent with some volume depletion and 

obstruction from renal calculi, but not diagnostic for acute kidney. IVF given 

and creatinine slightly elevated further to 1.38, but without any other 

abnormalities. Recheck in 3 days. 





# Leukocytosis: Mildly increased WBC and neutrophilia on admission with mild 

fever approximately 8 hours after admission without recurrence and no symptoms 

of infectious process and exam nonfocal. WBC normalized and CRP stable on day 

of discharge. 





Chronic conditions:


# GERD: Continued PPI.


# Chronic low back pain: Continued home regimen for which he is followed by 

[facility].


# Insomnia: Continued trazodone. 


# Tobacco dependence. Nicotine patch provided. Encouraged cutting back. 





- Discharge Data


Discharge Date: 10/13/17


Discharge Disposition: Home, Self-Care 01


Condition: Good





- Patient Instructions


Diet: Heart Healthy Diet, Drink 8-10+ Glasses/Day


Activity: As Tolerated


Showering/Bathing: May Shower


Notify Provider of: Fever, Increased Pain, Nausea and/or Vomiting





- Discharge Plan


Prescriptions/Med Rec: 


Hydrochlorothiazide 12.5 mg PO DAILY #30 tablet


Tamsulosin [Flomax] 0.4 mg PO DAILY #7 cap.er


Home Medications: 


 Home Meds





Acetaminophen [Tylenol] 325 - 650 mg PO Q4H PRN 08/27/15 [History]


Aspirin [Halfprin] 81 mg PO BID 08/27/15 [History]


Celecoxib [CeleBREX] 100 mg PO BID 08/27/15 [History]


DULoxetine [Cymbalta] 30 mg PO ACBREAKFAST 08/27/15 [History]


DULoxetine [Cymbalta] 60 mg PO BEDTIME 08/27/15 [History]


Multivitamin with Minerals [Multiple Vitamin] 1 tab PO DAILY 08/27/15 [History]


Polyethylene Glycol 3350 [MiraLAX] 17 gm PO DAILY PRN 08/27/15 [History]


Ranitidine [Zantac] 150 mg PO BID 08/27/15 [History]


tiZANidine [Zanaflex] 4 - 8 mg PO Q4H PRN 08/27/15 [History]


traZODone 100 mg PO BEDTIME 08/27/15 [History]


oxyCODONE ER [OxyCONTIN] 10 mg PO TID 10/11/17 [History]


Gabapentin [Neurontin] 200 mg PO BEDTIME 10/12/17 [History]


Lisinopril 40 mg PO DAILY 10/12/17 [History]


Metoprolol Succinate [Toprol Xl] 50 mg PO BID #0 10/12/17 [Rx]


Pramipexole Di-HCl [Mirapex] 0.125 - 0.25 mg PO BEDTIME 10/12/17 [History]


Tamsulosin [Flomax] 0.4 mg PO DAILY #7 cap.er 10/12/17 [Rx]


diphenhydrAMINE HCl [Benadryl] 25 mg PO TID 10/12/17 [History]


Hydrochlorothiazide 12.5 mg PO DAILY #30 tablet 10/13/17 [Rx]








Forms:  ED Department Discharge


Referrals: 


Merlyn Mota MD [Primary Care Provider] -  (Follow-up with [facility] or 

[facility] in 3-5 days.)





- General Info


Admission Dx/Problem (Free Text: 


Renal calculi, hypertensive urgency, bradycardia


Subjective Update: 


This morning, Mr. Lee reports minimal left abdominal and side pain. He 

is at his chronic state of lower back pain unchanged from prior to 

hospitalization. Has yet to pass any stones. Nausea and anorexia resolved. No 

new concerns. Feels close to his baseline state and desires discharge to home.





Denies fever, chills, headache, chest pain, shortness of breath, lightheadedness

, dizziness, or other concerns.





- Patient Data


Vitals - Most Recent: 


 Last Vital Signs











Temp  38.1 C   10/13/17 06:17


 


Pulse  65   10/13/17 08:20


 


Resp  18   10/13/17 06:17


 


BP  162/91 H  10/13/17 08:20


 


Pulse Ox  90 L  10/13/17 09:03











Weight - Most Recent: 79.549 kg


I&O - Last 24 hours: 


 Intake & Output











 10/12/17 10/13/17 10/13/17





 22:59 06:59 14:59


 


Intake Total 1236 1354 


 


Output Total 425 600 


 


Balance 811 754 











Lab Results - Last 24 hrs: 


 Laboratory Results - last 24 hr











  10/12/17 10/13/17 10/13/17 Range/Units





  08:58 07:05 07:05 


 


WBC   7.9   (5.0-10.0)  10^3/uL


 


RBC   3.85 L   (4.50-6.00)  10^6/uL


 


Hgb   12.0 L   (13.0-17.0)  g/dL


 


Hct   36.1 L   (40.0-52.0)  %


 


MCV   94.0 H   (82.0-92.0)  fL


 


MCH   31.2 H   (27.0-31.0)  pg


 


MCHC   33.2   (32.0-36.0)  g/dL


 


RDW   13.9   (11.5-14.5)  %


 


Plt Count   146 L   (150-300)  10^3/uL


 


MPV   8.0   (7.4-10.4)  fL


 


Neut % (Auto)   67.9   (50.0-70.0)  %


 


Lymph % (Auto)   16.2 L   (20.0-40.0)  %


 


Mono % (Auto)   14.9 H   (2.0-8.0)  %


 


Eos % (Auto)   0.7 L   (1.0-3.0)  %


 


Baso % (Auto)   0.3   (0.0-1.0)  %


 


Neut # (Auto)   5.3   (2.5-7.0)  10^3/uL


 


Lymph # (Auto)   1.3   (1.0-4.0)  10^3/uL


 


Mono # (Auto)   1.2 H   (0.1-0.8)  10^3/uL


 


Eos # (Auto)   0.1   (0.1-0.3)  10^3/uL


 


Baso # (Auto)   0.0   (0.0-0.1)  10^3/uL


 


Sodium  137   139  (136-145)  mmol/L


 


Potassium  4.0   4.5  (3.3-5.3)  mmol/L


 


Chloride  102   107  ()  mmol/L


 


Carbon Dioxide  23.6   23.6  (21.0-32.0)  mmol/L


 


BUN  21   21  (6-25)  mg/dL


 


Creatinine  1.29 H   1.38 H  (0.51-1.17)  mg/dL


 


Est Cr Clr Drug Dosing  62.52   58.44  mL/min


 


Estimated GFR (MDRD)  56   51  mL/min


 


Glucose  123 H   82  ()  mg/dL


 


Calcium  8.7   8.3 L  (8.7-10.3)  mg/dL


 


C-Reactive Protein  2.3 H   2.3 H  (0.0-0.9)  mg/dL











Med Orders - Current: 


 Current Medications





Acetaminophen (Tylenol)  325 - 650 mg PO Q4H PRN


   PRN Reason: Pain


Aspirin (Halfprin)  81 mg PO BID [facility]


   Last Admin: 10/13/17 08:19 Dose:  81 mg


Celecoxib (Celebrex)  100 mg PO BID [facility]


   Last Admin: 10/13/17 08:19 Dose:  100 mg


Diphenhydramine HCl (Benadryl)  25 mg PO TID PRN


   PRN Reason: Itching


Duloxetine HCl (Cymbalta)  30 mg PO ACBREAKFAST [facility]


   Last Admin: 10/13/17 07:21 Dose:  30 mg


Duloxetine HCl (Cymbalta)  60 mg PO BEDTIME [facility]


   Last Admin: 10/12/17 20:35 Dose:  60 mg


Enoxaparin Sodium (Lovenox)  30 mg SUBCUT DAILY [facility]


   Last Admin: 10/13/17 08:20 Dose:  30 mg


Gabapentin (Neurontin)  200 mg PO BEDTIME [facility]


   Last Admin: 10/12/17 20:35 Dose:  200 mg


Hydromorphone HCl (Dilaudid)  1 mg IVPUSH Q4H PRN


   PRN Reason: Pain (severe 7-10)


   Last Admin: 10/12/17 05:52 Dose:  1 mg


Sodium Chloride (Normal Saline)  1,000 mls @ 125 mls/hr IV ASDIRECTED [facility]


   Last Admin: 10/13/17 06:02 Dose:  125 mls/hr


Labetalol HCl (Normodyne)  0 mg IVPUSH Q4H PRN; Protocol


   PRN Reason: Other


Lisinopril (Prinivil)  40 mg PO DAILY [facility]


   Last Admin: 10/13/17 08:20 Dose:  40 mg


Metoprolol Succinate (Toprol Xl)  50 mg PO DAILY [facility]


   Last Admin: 10/13/17 08:20 Dose:  50 mg


Multivitamins/Minerals (Centrum)  1 tab PO DAILY [facility]


   Last Admin: 10/13/17 08:19 Dose:  1 tab


Nicotine (Habitrol)  21 mg TRDERM DAILY PRN


   PRN Reason: craving


Ondansetron HCl (Zofran)  4 mg IVPUSH Q6H PRN


   PRN Reason: Nausea/Vomiting


Oxycodone HCl (Oxycontin)  10 mg PO TID [facility]


   Last Admin: 10/13/17 08:20 Dose:  10 mg


Oxycodone HCl (Oxycodone)  5 - 10 mg PO Q4H PRN


   PRN Reason: Pain


   Last Admin: 10/12/17 12:39 Dose:  10 mg


Polyethylene Glycol (Miralax)  17 gm PO DAILY PRN


   PRN Reason: Constipation


Pramipexole Dihydrochloride (Mirapex)  0.125 - 0.25 mg PO BEDTIME [facility]


   Last Admin: 10/12/17 20:38 Dose:  0.125 mg


Ranitidine HCl (Zantac)  150 mg PO BID [facility]


   Last Admin: 10/13/17 08:20 Dose:  150 mg


Tizanidine HCl (Zanaflex)  4 mg PO Q4H PRN


   PRN Reason: Muscle Spasm


   Last Admin: 10/12/17 14:10 Dose:  4 mg


Trazodone HCl (Trazodone)  100 mg PO BEDTIME [facility]


   Last Admin: 10/12/17 20:39 Dose:  100 mg





Discontinued Medications





Clonidine HCl (Catapres)  0.2 mg PO ONETIME ONE


   Stop: 10/11/17 20:39


   Last Admin: 10/11/17 20:45 Dose:  0.2 mg


Hydromorphone HCl (Dilaudid)  0.5 mg IVPUSH ONETIME ONE


   Stop: 10/11/17 17:49


   Last Admin: 10/11/17 17:58 Dose:  0.5 mg


Hydromorphone HCl (Dilaudid)  1 mg IVPUSH ONETIME ONE


   Stop: 10/11/17 18:35


   Last Admin: 10/11/17 18:56 Dose:  1 mg


Sodium Chloride (Normal Saline)  1,000 mls @ 999 mls/hr IV .BOLUS ONE


   Stop: 10/11/17 18:13


   Last Admin: 10/11/17 17:30 Dose:  999 mls/hr


Iopamidol (Isovue-300 (61%))  75 ml IV ONETIME ONE


   Stop: 10/11/17 19:19


   Last Admin: 10/11/17 19:22 Dose:  75 ml


Ondansetron HCl (Zofran)  4 mg IVPUSH ONETIME ONE


   Stop: 10/11/17 17:14


   Last Admin: 10/11/17 17:30 Dose:  4 mg


Ondansetron HCl (Zofran)  8 mg IVPUSH ONETIME ONE


   Stop: 10/11/17 19:59


   Last Admin: 10/11/17 20:10 Dose:  8 mg


Ondansetron HCl (Zofran Odt)  4 mg PO Q8H PRN


   PRN Reason: Nausea/Vomiting


Oxycodone HCl (Oxycodone)  10 mg PO Q6H PRN


   PRN Reason: Pain


   Last Admin: 10/11/17 20:15 Dose:  10 mg


Oxycodone HCl (Oxycodone) Confirm Administered Dose 40 mg .ROUTE .STK-MED ONE


   Stop: 10/11/17 20:12


   Last Admin: 10/12/17 03:15 Dose:  Not Given


Sodium Chloride (Normal Saline)  50 ml FLUSH ASDIRECTED [facility]


   Last Admin: 10/11/17 19:23 Dose:  50 ml


Tamsulosin HCl (Flomax)  0.4 mg PO ONETIME ONE


   Stop: 10/11/17 19:59


   Last Admin: 10/11/17 20:15 Dose:  0.4 mg


Tamsulosin HCl (Flomax)  0.4 mg PO BIDPC [facility]


   Stop: 10/13/17 09:01


   Last Admin: 10/13/17 08:19 Dose:  0.4 mg


Tamsulosin HCl (Flomax) Confirm Administered Dose 0.4 mg .ROUTE .STK-MED ONE


   Stop: 10/11/17 20:11


   Last Admin: 10/12/17 03:16 Dose:  Not Given











- Exam


Physical Findings Comments:: 


GENERAL: Well-appearing elderly male appearing older than stated age in no 

acute distress.


HEENT: Normocephalic, atraumatic.  Conjunctiva clear.  Nares patent without 

discharge.  Mucous membranes moist.


NECK: Supple, no masses.


CV: Bradycardia, regular rhythm, no murmurs, rubs, or gallops.  2+ radial 

pulses.


PULMONARY: Normal effort, clear to auscultation bilaterally, no wheezes, rales, 

or rhonchi.


ABDOMEN: Positive bowel sounds, soft, no tenderness to palpation or percussion, 

nondistended.


EXTREMITIES: No edema, cyanosis, or clubbing.


MUSCULOSKELETAL: Moves all extremities well.


NEUROLOGICAL: No obvious deficits.


DERMATOLOGIC: No rashes or suspicious lesions in exposed areas.


PSYCHIATRIC: Alert, interactive, appropriate affect.





*Q Meaningful Use (DIS)





- VTE *Q


VTE Criteria *Q: 








- Stroke *Q


Stroke Criteria *Q: 








- AMI *Q


AMI Criteria *Q:

## 2018-06-20 ENCOUNTER — HOSPITAL ENCOUNTER (EMERGENCY)
Dept: HOSPITAL 77 - KA.ED | Age: 68
Discharge: NURSING, REHAB OR LONG-TERM CARE | End: 2018-06-20
Payer: MEDICARE

## 2018-06-20 VITALS — SYSTOLIC BLOOD PRESSURE: 74 MMHG | DIASTOLIC BLOOD PRESSURE: 40 MMHG

## 2018-06-20 DIAGNOSIS — R20.0: ICD-10-CM

## 2018-06-20 DIAGNOSIS — Z88.8: ICD-10-CM

## 2018-06-20 DIAGNOSIS — K21.9: ICD-10-CM

## 2018-06-20 DIAGNOSIS — E86.0: ICD-10-CM

## 2018-06-20 DIAGNOSIS — Z79.82: ICD-10-CM

## 2018-06-20 DIAGNOSIS — Z91.048: ICD-10-CM

## 2018-06-20 DIAGNOSIS — Z79.899: ICD-10-CM

## 2018-06-20 DIAGNOSIS — I10: ICD-10-CM

## 2018-06-20 DIAGNOSIS — M62.81: ICD-10-CM

## 2018-06-20 DIAGNOSIS — I95.9: Primary | ICD-10-CM

## 2018-06-20 LAB
CHLORIDE SERPL-SCNC: 99 MMOL/L (ref 98–115)
SODIUM SERPL-SCNC: 133 MMOL/L (ref 136–145)

## 2018-06-20 PROCEDURE — 99285 EMERGENCY DEPT VISIT HI MDM: CPT

## 2018-06-20 PROCEDURE — 80048 BASIC METABOLIC PNL TOTAL CA: CPT

## 2018-06-20 PROCEDURE — 85025 COMPLETE CBC W/AUTO DIFF WBC: CPT

## 2018-06-20 PROCEDURE — 36415 COLL VENOUS BLD VENIPUNCTURE: CPT

## 2018-06-20 PROCEDURE — 96374 THER/PROPH/DIAG INJ IV PUSH: CPT

## 2018-06-20 PROCEDURE — 84484 ASSAY OF TROPONIN QUANT: CPT

## 2018-06-20 PROCEDURE — 96361 HYDRATE IV INFUSION ADD-ON: CPT

## 2018-06-20 NOTE — EDM.PDOC
ED HPI GENERAL MEDICAL PROBLEM





- General


Chief Complaint: Back Pain or Injury


Time Seen by Provider: 06/20/18 08:04


Source of Information: Reports: Patient, EMS, Family (wife)


History Limitations: Reports: No Limitations





- History of Present Illness


INITIAL COMMENTS - FREE TEXT/NARRATIVE: 





Patient presents via ambulance with hypotension and bilat leg pain.  This 

started early this morning, not sure exact time but his wife says he had been 

up in a chair awhile before she got up at 0530.  They tell me he has had leg 

pain, numbness and weakness in the past but never this bad or lasting this 

long.  He hasn't had low blood pressure before and assures me he didn't take 

more than the prescribed dose of his BP meds.  He doesn't drink much water 

though; his wife estimates about 16 ounces a day.  He denies CHF, chest pain, 

dyspnea, falling/trauma.  He normally walks with a cane but quite unsteady per 

wife.  He is a current 1 ppd smoker with 50+ year history.  BP was 55/38 when 

ambulance picked up him up and was a little better on ER arrival.


  ** Lower Back


Pain Score (Numeric/FACES): 10





- Related Data


 Allergies











Allergy/AdvReac Type Severity Reaction Status Date / Time


 


adhesive Allergy  Rash Verified 06/20/18 08:16


 


bacitracin Allergy  Rash Verified 06/20/18 08:16





[From Neosporin     





([id number])]     


 


bacitracin zinc Allergy  Rash Verified 06/20/18 08:16





[From Neosporin     





([id number])]     


 


neomycin sulfate Allergy  Rash Verified 06/20/18 08:16





[From Neosporin     





([id number])]     


 


polymyxin B Allergy  Rash Verified 06/20/18 08:16





[From Neosporin     





([id number])]     











Home Meds: 


 Home Meds





Acetaminophen [Tylenol] 325 - 650 mg PO Q4H PRN 08/27/15 [History]


Aspirin [Halfprin] 81 mg PO BID 08/27/15 [History]


Celecoxib [CeleBREX] 100 mg PO BID 08/27/15 [History]


DULoxetine [Cymbalta] 30 mg PO ACBREAKFAST 08/27/15 [History]


DULoxetine [Cymbalta] 60 mg PO BEDTIME 08/27/15 [History]


Polyethylene Glycol 3350 [MiraLAX] 17 gm PO DAILY PRN 08/27/15 [History]


Ranitidine [Zantac] 150 mg PO BID 08/27/15 [History]


tiZANidine [Zanaflex] 4 - 8 mg PO Q4H PRN 08/27/15 [History]


traZODone 100 mg PO BEDTIME 08/27/15 [History]


Gabapentin [Neurontin] 200 mg PO BEDTIME 10/12/17 [History]


Lisinopril 40 mg PO DAILY 10/12/17 [History]


Metoprolol Succinate [Toprol Xl] 50 mg PO BID #0 10/12/17 [Rx]


Pramipexole Di-HCl [Mirapex] 0.125 - 0.25 mg PO BEDTIME 10/12/17 [History]


diphenhydrAMINE HCl [Benadryl] 25 mg PO TID 10/12/17 [History]


Hydrochlorothiazide 12.5 mg PO DAILY #30 tablet 10/13/17 [Rx]


oxyCODONE 5 mg PO BID 06/20/18 [History]











Past Medical History


HEENT History: Reports: Cataract, Hard of Hearing, Impaired Vision, Other (See 

Below)


Other HEENT History: ocular muscular dystrophy


Cardiovascular History: Reports: Hypertension


Respiratory History: Reports: Pneumonia, Recurrent, Other (See Below)


Other Respiratory History: heavy smoker


Gastrointestinal History: Reports: Chronic Constipation, GERD, Other (See Below)


Other Gastrointestinal History: ulcer


Musculoskeletal History: Reports: Back Pain, Chronic, Muscular Dystrophy


Neurological History: Reports: None


Psychiatric History: Reports: Anxiety, Depression





- Infectious Disease History


Infectious Disease History: Reports: Measles, Mumps





- Past Surgical History


HEENT Surgical History: Reports: None


Cardiovascular Surgical History: Reports: None


GI Surgical History: Reports: Appendectomy, Cholecystectomy, Colonoscopy, 

Nissen Fundoplication


Neurological Surgical History: Reports: Discectomy


Dermatological Surgical History: Reports: Skin Graft





Social & Family History





- Family History


Family Medical History: Noncontributory





- Caffeine Use


Caffeine Use: Reports: None





ED ROS GENERAL





- Review of Systems


Review Of Systems: See Below


Constitutional: Reports: Weakness.  Denies: Fever, Chills


HEENT: Denies: Ear Pain, Throat Swelling


Respiratory: Denies: Shortness of Breath, Wheezing


Cardiovascular: Reports: Blood Pressure Problem (high).  Denies: Chest Pain, 

Lightheadedness, Syncope


GI/Abdominal: Denies: Abdominal Pain, Diarrhea, Nausea, Vomiting


: Denies: Dysuria, Incontinence


Musculoskeletal: Reports: Back Pain (low, chronic), Leg Pain (bilat).  Denies: 

Neck Pain, Shoulder Pain, Arm Pain, Hand Pain


Skin: Denies: Cyanosis, Jaundice, Mottled


Neurological: Reports: Numbness, Difficulty Walking.  Denies: Confusion, 

Dizziness, Headache, Trouble Speaking


Psychiatric: Denies: Agitation, Anxiety, Confusion





ED EXAM, NEURO





- Physical Exam


Exam: See Below


Exam Limited By: No Limitations


General Appearance: Alert, WD/WN, Anxious


Eye Exam: Bilateral Eye: EOMI, Normal Inspection, PERRL


Ears: Normal External Exam, Hearing Grossly Normal


Nose: Normal Inspection, No Blood


Throat/Mouth: Normal Inspection, Normal Lips, Normal Voice, No Airway Compromise


Head Exam: Atraumatic, Normocephalic


Neck: Normal Inspection, Full Range of Motion


Respiratory/Chest: No Respiratory Distress, Lungs Clear, Normal Breath Sounds, 

No Accessory Muscle Use


Cardiovascular: Regular Rate, Rhythm, No Edema, No Gallop, No Murmur, No Rub


GI/Abdominal: Normal Bowel Sounds, Soft, Non-Tender, No Organomegaly, No 

Distention


Neurological: Alert, Normal Mood/Affect, Oriented x 3, Other (numbness left 

lateral thigh; decreased sensation of right lateral thigh, bilat ankles.  

Cannot dorsiflex or plantar flex bilat; cannot raise either leg but there is 

mild effort/movement noted on right, but cannot clear table.  Re-assessment of 

this later shows improvement in leg movement bilat but he cannot raise off 

table with either leg.  )


Extremities: No Pedal Edema, Leg Pain, Limited Range of Motion (no AROM), 

Pallor.  No: Mottled, Redness


Psychiatric: Normal Affect, Normal Mood


Skin Exam: Warm, Dry, Intact, Pallor.  No: Decubitus, Diaphoretic, Ecchymosis, 

Erythema, Rash, Wound/Incision





Course





- Vital Signs


Last Recorded V/S: 


 Last Vital Signs











Temp  98.2 F   06/20/18 08:10


 


Pulse  68   06/20/18 08:10


 


Resp  13   06/20/18 08:10


 


BP  74/40 L  06/20/18 08:10


 


Pulse Ox  92 L  06/20/18 08:10














- Orders/Labs/Meds


Orders: 


 Active Orders 24 hr











 Category Date Time Status


 


 EKG Documentation Completion [RC] ASDIRECTED Care  06/20/18 08:28 Ordered


 


 Sodium Chloride 0.9% @ 999 MLS/HR (1000ml) Med  06/20/18 08:59 Ordered





 Sodium Chloride 0.9% [Normal Saline] 1,000 ml   





 IV .BOLUS   


 


 EKG 12 Lead [EK] Routine Ther  06/20/18 08:26 Ordered








 Medication Orders





Sodium Chloride (Normal Saline)  1,000 mls @ 999 mls/hr IV .BOLUS ONE


   Stop: 06/20/18 09:59


   Last Admin: 06/20/18 09:05  Dose: 999 mls/hr








Labs: 


 Laboratory Tests











  06/20/18 06/20/18 Range/Units





  08:06 08:06 


 


WBC  9.6   (5.0-10.0)  10^3/uL


 


RBC  4.05 L   (4.50-6.00)  10^6/uL


 


Hgb  12.2 L   (13.0-17.0)  g/dL


 


Hct  37.8 L   (40.0-52.0)  %


 


MCV  93.2 H   (82.0-92.0)  fL


 


MCH  30.0   (27.0-31.0)  pg


 


MCHC  32.2   (32.0-36.0)  g/dL


 


RDW  14.9 H   (11.5-14.5)  %


 


Plt Count  238  D   (150-300)  10^3/uL


 


MPV  7.6   (7.4-10.4)  fL


 


Neut % (Auto)  71.1 H   (50.0-70.0)  %


 


Lymph % (Auto)  19.8 L   (20.0-40.0)  %


 


Mono % (Auto)  7.1   (2.0-8.0)  %


 


Eos % (Auto)  1.8   (1.0-3.0)  %


 


Baso % (Auto)  0.2   (0.0-1.0)  %


 


Neut # (Auto)  6.8   (2.5-7.0)  10^3/uL


 


Lymph # (Auto)  1.9   (1.0-4.0)  10^3/uL


 


Mono # (Auto)  0.7   (0.1-0.8)  10^3/uL


 


Eos # (Auto)  0.2   (0.1-0.3)  10^3/uL


 


Baso # (Auto)  0.0   (0.0-0.1)  10^3/uL


 


Sodium   133 L  (136-145)  mmol/L


 


Potassium   5.3  (3.3-5.3)  mmol/L


 


Chloride   99  ()  mmol/L


 


Carbon Dioxide   22.0  (21.0-32.0)  mmol/L


 


BUN   44 H  (6-25)  mg/dL


 


Creatinine   2.40 H  (0.51-1.17)  mg/dL


 


Est Cr Clr Drug Dosing   30.42  mL/min


 


Estimated GFR (MDRD)   27  mL/min


 


Glucose   177 H  ()  mg/dL


 


Calcium   8.4 L  (8.7-10.3)  mg/dL


 


Troponin I   0.05  (0.00-0.070)  ng/mL











Meds: 


Medications











Generic Name Dose Route Start Last Admin





  Trade Name Freq  PRN Reason Stop Dose Admin


 


Sodium Chloride  1,000 mls @ 999 mls/hr  06/20/18 08:59  06/20/18 09:05





  Normal Saline  IV  06/20/18 09:59  999 mls/hr





  .BOLUS ONE   Administration





     





     





     





     














Discontinued Medications














Generic Name Dose Route Start Last Admin





  Trade Name Freq  PRN Reason Stop Dose Admin


 


Sodium Chloride  1,000 mls @ 999 mls/hr  06/20/18 07:55  06/20/18 08:11





  Normal Saline  IV  06/20/18 08:55  999 mls/hr





  .BOLUS ONE   Administration





     





     





     





     


 


Ketorolac Tromethamine  30 mg  06/20/18 08:31  06/20/18 08:36





  Toradol  IVPUSH  06/20/18 08:32  30 mg





  ONETIME ONE   Administration





     





     





     





     














- Re-Assessments/Exams


Free Text/Narrative Re-Assessment/Exam: 





06/20/18 09:10


Discussed case and obtained more patient history from Maninder Ramirez NP who was 

in house and could access more history from [place].  Patient hasn't had an MRI 

of his back for nearly two years and has been seeing a pain specialist for 

chronic back pain; takes two oxycodone IR daily.  With the hypotension can't 

give narcotics so gave a dose of Toradol.  Called ER at [facility] and 

discussed case with Dr. Lowe who accepted for transfer.  


  


06/20/18 09:17


Patient is noting improvement in leg pain and is able to move the legs somewhat 

after fluids and Toradol.  Troponin and EKG are normal.  Creatinine is elevated 

much more than prior records.  Patient is becoming more stable prior to transfer

; ambulance is here now to transport.





Departure





- Departure


Time of Disposition: 09:19


Disposition: DC/Tfer to [facility] 02


Condition: Fair


Clinical Impression: 


 Dehydration, Leg weakness, bilateral, Left leg numbness





Hypotension


Qualifiers:


 Hypotension type: unspecified hypotension type Qualified Code(s): I95.9 - 

Hypotension, unspecified








- Discharge Information


Forms:  ED Department Discharge





- My Orders


Last 24 Hours: 


My Active Orders





06/20/18 08:26


EKG 12 Lead [EK] Routine 





06/20/18 08:28


EKG Documentation Completion [RC] ASDIRECTED 





06/20/18 08:59


Sodium Chloride 0.9% @ 999 MLS/HR (1000ml) Sodium Chloride 0.9% [Normal Saline] 

1,000 ml IV .BOLUS 














- Assessment/Plan


Last 24 Hours: 


My Active Orders





06/20/18 08:26


EKG 12 Lead [EK] Routine 





06/20/18 08:28


EKG Documentation Completion [RC] ASDIRECTED 





06/20/18 08:59


Sodium Chloride 0.9% @ 999 MLS/HR (1000ml) Sodium Chloride 0.9% [Normal Saline] 

1,000 ml IV .BOLUS

## 2018-07-03 ENCOUNTER — HOSPITAL ENCOUNTER (INPATIENT)
Dept: HOSPITAL 77 - KA.ED | Age: 68
LOS: 3 days | Discharge: NURSING, REHAB OR LONG-TERM CARE | DRG: 556 | End: 2018-07-06
Attending: FAMILY MEDICINE | Admitting: PHYSICIAN ASSISTANT
Payer: MEDICARE

## 2018-07-03 DIAGNOSIS — M54.9: ICD-10-CM

## 2018-07-03 DIAGNOSIS — F17.200: ICD-10-CM

## 2018-07-03 DIAGNOSIS — F41.8: ICD-10-CM

## 2018-07-03 DIAGNOSIS — N17.9: ICD-10-CM

## 2018-07-03 DIAGNOSIS — D50.9: ICD-10-CM

## 2018-07-03 DIAGNOSIS — N40.1: ICD-10-CM

## 2018-07-03 DIAGNOSIS — Z79.899: ICD-10-CM

## 2018-07-03 DIAGNOSIS — I10: ICD-10-CM

## 2018-07-03 DIAGNOSIS — E86.0: ICD-10-CM

## 2018-07-03 DIAGNOSIS — M79.605: Primary | ICD-10-CM

## 2018-07-03 DIAGNOSIS — I95.9: ICD-10-CM

## 2018-07-03 DIAGNOSIS — Z88.8: ICD-10-CM

## 2018-07-03 DIAGNOSIS — K21.9: ICD-10-CM

## 2018-07-03 DIAGNOSIS — R33.8: ICD-10-CM

## 2018-07-03 DIAGNOSIS — G71.0: ICD-10-CM

## 2018-07-03 DIAGNOSIS — F11.20: ICD-10-CM

## 2018-07-03 LAB
CHLORIDE SERPL-SCNC: 102 MMOL/L (ref 98–115)
SODIUM SERPL-SCNC: 135 MMOL/L (ref 136–145)

## 2018-07-03 NOTE — EDM.PDOC
ED HPI GENERAL MEDICAL PROBLEM





- General


Chief Complaint: Lower Extremity Injury/Pain


Stated Complaint: legs went numb, pain


Time Seen by Provider: 07/03/18 02:38


Source of Information: Reports: Patient


History Limitations: Reports: No Limitations





- History of Present Illness


INITIAL COMMENTS - FREE TEXT/NARRATIVE: 





Patient is a 67-year-old gentleman who presents to the emergency department via 

EMS for complaint of lower extremity pain.  Wife states that he has been 

decreasing his OxyContin use and pain has been worsening.  He woke up in middle 

of night with pain in his legs, so she decided to contact EMS.  Patient was 

seen here on 06/20/2018 with same symptoms and found to be hypotensive.  He was 

transferred to [facility] for further evaluation.  At [facility] He 

underwent MRI, which was negative.  Patient was discharged 2 days later and had 

a follow-up at [facility] this past Friday.  Per his wife they were unable 

to make that appointment because his legs are weak and he was unable to 

ambulate well.  Currently, patient denies chest pain, shortness of breath, 

abdominal pain, testicular pain, bowel or urinary incontinence, or fever.  Wife 

states it is very difficult to care for her .


Onset: Gradual


Duration: Chronic


Location: Reports: Lower Extremity, Left, Lower Extremity, Right


Quality: Reports: Ache, Other (Numbness)


Severity: Moderate


Improves with: Reports: Medication


Worsens with: Reports: Movement


Context: Reports: Other (Denies trauma)


Associated Symptoms: Reports: No Other Symptoms


Treatments PTA: Reports: Other Medication(s)


  ** Lower Back


Pain Score (Numeric/FACES): 10





- Related Data


 Allergies











Allergy/AdvReac Type Severity Reaction Status Date / Time


 


adhesive Allergy  Rash Verified 07/03/18 02:23


 


bacitracin Allergy  Rash Verified 07/03/18 02:23





[From Neosporin     





([id number])]     


 


bacitracin zinc Allergy  Rash Verified 07/03/18 02:23





[From Neosporin     





([id number])]     


 


neomycin sulfate Allergy  Rash Verified 07/03/18 02:23





[From Neosporin     





([id number])]     


 


polymyxin B Allergy  Rash Verified 07/03/18 02:23





[From Neosporin     





(joy-rvc-diice)]     











Home Meds: 


 Home Meds





Acetaminophen [Tylenol] 325 - 650 mg PO Q4H PRN 08/27/15 [History]


Celecoxib [CeleBREX] 100 mg PO BID 08/27/15 [History]


DULoxetine [Cymbalta] 30 mg PO ACBREAKFAST 08/27/15 [History]


DULoxetine [Cymbalta] 60 mg PO BEDTIME 08/27/15 [History]


Polyethylene Glycol 3350 [MiraLAX] 17 gm PO DAILY 08/27/15 [History]


Ranitidine [Zantac] 150 mg PO BID 08/27/15 [History]


tiZANidine [Zanaflex] 4 - 8 mg PO Q4H PRN 08/27/15 [History]


traZODone 100 mg PO BEDTIME 08/27/15 [History]


Gabapentin [Neurontin] 300 mg PO TID 10/12/17 [History]


Lisinopril 40 mg PO DAILY 10/12/17 [History]


Metoprolol Succinate [Toprol Xl] 50 mg PO BID #0 10/12/17 [Rx]


diphenhydrAMINE HCl [Benadryl] 25 mg PO TID 10/12/17 [History]


Hydrochlorothiazide 12.5 mg PO DAILY #30 tablet 10/13/17 [Rx]


oxyCODONE 5 mg PO BID 06/20/18 [History]


Ferrous Sulfate 325 mg PO DAILY 07/03/18 [History]


Finasteride [Proscar] 5 mg PO DAILY 07/03/18 [History]


Tamsulosin [Tamsulosin 24 Hr] 0.4 mg PO DAILY 07/03/18 [History]











Past Medical History


HEENT History: Reports: Cataract, Hard of Hearing, Impaired Vision, Other (See 

Below)


Other HEENT History: ocular muscular dystrophy


Cardiovascular History: Reports: Hypertension


Respiratory History: Reports: Pneumonia, Recurrent, Other (See Below)


Other Respiratory History: heavy smoker


Gastrointestinal History: Reports: Chronic Constipation, GERD, Other (See Below)


Other Gastrointestinal History: ulcer


Musculoskeletal History: Reports: Back Pain, Chronic, Muscular Dystrophy


Neurological History: Reports: None


Psychiatric History: Reports: Anxiety, Depression





- Infectious Disease History


Infectious Disease History: Reports: Measles, Mumps





- Past Surgical History


HEENT Surgical History: Reports: None


Cardiovascular Surgical History: Reports: None


GI Surgical History: Reports: Appendectomy, Cholecystectomy, Colonoscopy, 

Nissen Fundoplication


Neurological Surgical History: Reports: Discectomy


Dermatological Surgical History: Reports: Skin Graft





Social & Family History





- Family History


Family Medical History: Noncontributory





- Caffeine Use


Caffeine Use: Reports: None





Review of Systems





- Review of Systems


Review Of Systems: ROS reveals no pertinent complaints other than HPI.


Constitutional: Reports: No Symptoms


Eyes: Reports: No Symptoms


Ears: Reports: No Symptoms


Nose: Reports: No Symptoms


Mouth/Throat: Reports: No Symptoms


Respiratory: Reports: No Symptoms


Cardiovascular: Reports: No Symptoms


GI/Abdominal: Reports: No Symptoms


Genitourinary: Reports: No Symptoms


Musculoskeletal: Reports: Leg Pain


Skin: Reports: No Symptoms


Neurological: Reports: No Symptoms


Psychiatric: Reports: No Symptoms





ED EXAM, GENERAL





- Physical Exam


Exam: See Below


Exam Limited By: No Limitations


General Appearance: Alert, WD/WN, Mild Distress


Throat/Mouth: Normal Inspection, Normal Oropharynx, No Airway Compromise


Head: Atraumatic, Normocephalic


Neck: Normal Inspection


Respiratory/Chest: No Respiratory Distress, Lungs Clear, Normal Breath Sounds, 

No Accessory Muscle Use, Chest Non-Tender


Cardiovascular: Normal Peripheral Pulses, Regular Rate, Rhythm, No Murmur


GI/Abdominal: Normal Bowel Sounds, Soft, Non-Tender, No Organomegaly, No 

Distention, No Abnormal Bruit, No Mass


Back Exam: Paraspinal Tenderness.  No: CVA Tenderness (L), CVA Tenderness (R)


Extremities: No Pedal Edema, Leg Pain (Bilateral), Other (Patient has pain to 

palpation and range of motion bilaterally.)


Neurological: Alert, Normal Cognition


Psychiatric: Normal Affect, Normal Mood


Skin Exam: Warm, Dry, Intact, Normal Color, No Rash


Lymphatic: No Adenopathy





Course





- Vital Signs


Last Recorded V/S: 





 Last Vital Signs











Temp  97.4 F   07/03/18 02:23


 


Pulse  69   07/03/18 02:23


 


Resp  18   07/03/18 02:23


 


BP  65/36 L  07/03/18 02:23


 


Pulse Ox  92 L  07/03/18 02:23














- Orders/Labs/Meds


Orders: 





 Active Orders 24 hr











 Category Date Time Status


 


 Peripheral IV Care [RC] .AS DIRECTED Care  07/03/18 02:46 Ordered


 


 Sodium Chloride 0.9% @ 999 MLS/HR (1000ml) Med  07/03/18 02:46 Ordered





 Sodium Chloride 0.9% [Normal Saline] 1,000 ml   





 IV .BOLUS   


 


 Sodium Chloride 0.9% [Syrex Flush] Med  07/03/18 02:46 Ordered





 5 ml FLUSH Q8HR PRN   


 


 Peripheral IV Insertion Adult [OM.PC] Routine Oth  07/03/18 02:46 Ordered








 Medication Orders





Sodium Chloride (Normal Saline)  1,000 mls @ 999 mls/hr IV .BOLUS ONE


   Stop: 07/03/18 03:46


Sodium Chloride (Syrex Flush)  5 ml FLUSH Q8HR PRN


   PRN Reason: Keep Vein Open








Meds: 





Medications











Generic Name Dose Route Start Last Admin





  Trade Name Freq  PRN Reason Stop Dose Admin


 


Sodium Chloride  1,000 mls @ 999 mls/hr  07/03/18 02:46  





  Normal Saline  IV  07/03/18 03:46  





  .BOLUS ONE   





     





     





     





     


 


Sodium Chloride  5 ml  07/03/18 02:46  





  Syrex Flush  FLUSH   





  Q8HR PRN   





  Keep Vein Open   





     





     





     














Discontinued Medications














Generic Name Dose Route Start Last Admin





  Trade Name Freq  PRN Reason Stop Dose Admin


 


Ketorolac Tromethamine  30 mg  07/03/18 02:46  





  Toradol  IM  07/03/18 02:47  





  ONETIME ONE   





     





     





     





     














- Re-Assessments/Exams


Free Text/Narrative Re-Assessment/Exam: 





07/03/18 03:50


Patient afebrile, nontoxic appearing, patient denies chest pain or shortness of 

breath, blood pressure improving with administration of IV fluids.  Now blood 

pressure 79/50 and heart rate 70.  Discussed case with Dr. Rochelle taylor and she 

will admit the patient for observation.  Lab work will be performed in the a.m.

  Results will be evaluated by [place] provider


07/03/18 03:51








Departure





- Departure


Time of Disposition: 03:52


Disposition: Refer to Observation


Condition: Fair


Clinical Impression: 


Hypotension


Qualifiers:


 Hypotension type: unspecified hypotension type Qualified Code(s): I95.9 - 

Hypotension, unspecified





Extremity pain


Qualifiers:


 Extremity pain location: lower extremity Laterality: bilateral Qualified Code(s

): M79.604 - Pain in right leg; M79.605 - Pain in left leg








- Discharge Information


Referrals: 


Merlyn Mota MD [Primary Care Provider] - 





- My Orders


Last 24 Hours: 





My Active Orders





07/03/18 02:46


Peripheral IV Care [RC] .AS DIRECTED 


Sodium Chloride 0.9% @ 999 MLS/HR (1000ml) Sodium Chloride 0.9% [Normal Saline] 

1,000 ml IV .BOLUS 


Sodium Chloride 0.9% [Syrex Flush]   5 ml FLUSH Q8HR PRN 


Peripheral IV Insertion Adult [OM.PC] Routine 














- Assessment/Plan


Last 24 Hours: 





My Active Orders





07/03/18 02:46


Peripheral IV Care [RC] .AS DIRECTED 


Sodium Chloride 0.9% @ 999 MLS/HR (1000ml) Sodium Chloride 0.9% [Normal Saline] 

1,000 ml IV .BOLUS 


Sodium Chloride 0.9% [Syrex Flush]   5 ml FLUSH Q8HR PRN 


Peripheral IV Insertion Adult [OM.PC] Routine 











Assessment:: 





Hypotension





Plan: 





Admitted to observation for Dr. Rochelle taylor

## 2018-07-03 NOTE — PCM.HP
H&P History of Present Illness





- General


Date of Service: 07/03/18


Admit Problem/Dx: 


 Admission Diagnosis/Problem





Admission Diagnosis/Problem      Hypotension








Source of Information: Patient, Old Records, Provider, RN


History Limitations: Reports: No Limitations





- History of Present Illness


Initial Comments - Free Text/Narative: 





Patient is a 67-year-old gentleman who presents to the emergency department via 

EMS for complaint of lower extremity pain.  Wife states that he has been 

decreasing his OxyContin use and pain has been worsening.  He woke up in middle 

of night with pain in his legs, so she decided to contact EMS.  Patient was 

seen here on 06/20/2018 with same symptoms and found to be hypotensive.  He was 

transferred to [facility] for further evaluation.  At [facility] He 

underwent MRI, which was negative.  Patient was discharged 2 days later and had 

a follow-up at [facility] this past Friday.  Per his wife they were unable 

to make that appointment because his legs are weak and he was unable to 

ambulate well.  Currently, patient denies chest pain, shortness of breath, 

abdominal pain, testicular pain, bowel or urinary incontinence, or fever.  Wife 

states it is very difficult to care for her .


  ** Lower Back


Pain Score (Numeric/FACES): 10





- Related Data


Allergies/Adverse Reactions: 


 Allergies











Allergy/AdvReac Type Severity Reaction Status Date / Time


 


adhesive Allergy  Rash Verified 07/03/18 02:23


 


bacitracin Allergy  Rash Verified 07/03/18 02:23





[From Neosporin     





([id number])]     


 


bacitracin zinc Allergy  Rash Verified 07/03/18 02:23





[From Neosporin     





(uyn-pyp-[id number])]     


 


neomycin sulfate Allergy  Rash Verified 07/03/18 02:23





[From Neosporin     





(nha-[id number])]     


 


polymyxin B Allergy  Rash Verified 07/03/18 02:23





[From Neosporin     





(tlm-dlj-pelrb)]     











Home Medications: 


 Home Meds





Acetaminophen [Tylenol] 325 - 650 mg PO Q4H PRN 08/27/15 [History]


Celecoxib [CeleBREX] 100 mg PO BID 08/27/15 [History]


DULoxetine [Cymbalta] 30 mg PO ACBREAKFAST 08/27/15 [History]


DULoxetine [Cymbalta] 60 mg PO BEDTIME 08/27/15 [History]


Polyethylene Glycol 3350 [MiraLAX] 17 gm PO DAILY 08/27/15 [History]


Ranitidine [Zantac] 150 mg PO BID 08/27/15 [History]


tiZANidine [Zanaflex] 4 - 8 mg PO Q4H PRN 08/27/15 [History]


traZODone 100 mg PO BEDTIME 08/27/15 [History]


Gabapentin [Neurontin] 300 mg PO TID 10/12/17 [History]


Metoprolol Succinate [Toprol Xl] 50 mg PO BID #0 10/12/17 [Rx]


diphenhydrAMINE HCl [Benadryl] 25 mg PO TID 10/12/17 [History]


oxyCODONE 5 mg PO BID 06/20/18 [History]


Ferrous Sulfate 325 mg PO DAILY 07/03/18 [History]


Finasteride [Proscar] 5 mg PO DAILY 07/03/18 [History]


Hydrochlorothiazide 12.5 mg PO DAILY 07/03/18 [History]


Ipratropium Bromide 2 sprays NASBOTH BID PRN 07/03/18 [History]


Lisinopril 40 mg PO DAILY 07/03/18 [History]


Tamsulosin [Tamsulosin 24 Hr] 0.4 mg PO DAILY 07/03/18 [History]











Past Medical History


HEENT History: Reports: Cataract, Hard of Hearing, Impaired Vision, Other (See 

Below)


Other HEENT History: ocular muscular dystrophy


Cardiovascular History: Reports: Hypertension


Respiratory History: Reports: Pneumonia, Recurrent, Other (See Below)


Other Respiratory History: heavy smoker


Gastrointestinal History: Reports: Chronic Constipation, GERD, Other (See Below)


Other Gastrointestinal History: ulcer


Genitourinary History: Reports: Prostate Disorder


Musculoskeletal History: Reports: Back Pain, Chronic, Muscular Dystrophy


Neurological History: Reports: None


Psychiatric History: Reports: Anxiety, Depression





- Infectious Disease History


Infectious Disease History: Reports: Measles, Mumps





- Past Surgical History


HEENT Surgical History: Reports: None


Cardiovascular Surgical History: Reports: None


GI Surgical History: Reports: Appendectomy, Cholecystectomy, Colonoscopy, 

Nissen Fundoplication


Neurological Surgical History: Reports: Discectomy


Dermatological Surgical History: Reports: Skin Graft





Social & Family History





- Family History


Family Medical History: Noncontributory





- Tobacco Use


Smoking Status *Q: Current Every Day Smoker


Years of Tobacco use: 50


Packs/Tins Daily: 1


Second Hand Smoke Exposure: Yes





- Caffeine Use


Caffeine Use: Reports: None





- Recreational Drug Use


Recreational Drug Use: No





H&P Review of Systems





- Review of Systems:


Review Of Systems: See Below


General: Reports: Malaise, Weakness, Decreased Appetite.  Denies: Fever, Night 

Sweats


HEENT: Reports: No Symptoms


Pulmonary: Reports: No Symptoms


Cardiovascular: Reports: Blood Pressure Problem.  Denies: Chest Pain, Edema, 

Claudication


Gastrointestinal: Denies: Abdominal Pain, Anorexia, Bloody Stool, Difficulty 

Swallowing


Genitourinary: Reports: No Symptoms


Musculoskeletal: Reports: Muscle Pain, Muscle Stiffness, Other (lower 

extremities)


Skin: Reports: Dryness


Psychiatric: Reports: Mood Lability.  Denies: Confusion, Hallucinations


Neurological: Reports: Pre-Existing Deficit, Difficulty Walking, Weakness, Gait 

Disturbance.  Denies: Confusion, Dizziness, Numbness, Paresthesia, Seizure, 

Syncope, Tingling, Change in Speech


Hematologic/Lymphatic: Reports: No Symptoms


Immunologic: Reports: No Symptoms





Exam





- Exam


Exam: See Below





- Vital Signs


Vital Signs: 


 Last Vital Signs











Temp  99.1 F   07/03/18 07:00


 


Pulse  50 L  07/03/18 07:00


 


Resp  16   07/03/18 07:00


 


BP  93/52 L  07/03/18 07:00


 


Pulse Ox  92 L  07/03/18 07:00











Weight: 181 lb 4 oz





- Exam


Quality Assessment: No: Supplemental Oxygen


General: Alert, Oriented, Cooperative.  No: Mild Distress


HEENT: EOMI, Hearing Intact, Other (dry mucuous membranes. )


Neck: Supple


Lungs: Clear to Auscultation, Normal Respiratory Effort


Cardiovascular: Regular Rate, Regular Rhythm, Normal S1, Normal S2


GI/Abdominal Exam: Soft


 (Male) Exam: Deferred


Rectal (Males) Exam: Deferred


Back Exam: No: CVA Tenderness (L), CVA Tenderness (R)


Extremities: No Pedal Edema, Other (slow speech).  No: Pedal Edema


Peripheral Pulses: 2+: Radial (R), Femoral (L)


Skin: Dry, Other (cracked skin to feet with thickening of toenails. )


Neurological: Normal Speech, Sensation Intact, Hyporeflexia.  No: Babinski 

Absent


Neuro Extensive - Mental Status: Alert, Oriented x3, Memory Intact


Psychiatric: Alert, Normal Affect, Normal Mood





- Patient Data


Lab Results Last 24 hrs: 


 Laboratory Results - last 24 hr











  07/03/18 07/03/18 Range/Units





  07:15 07:15 


 


WBC  9.3   (5.0-10.0)  10^3/uL


 


RBC  3.70 L   (4.50-6.00)  10^6/uL


 


Hgb  11.4 L   (13.0-17.0)  g/dL


 


Hct  33.1 L   (40.0-52.0)  %


 


MCV  89.4  D   (82.0-92.0)  fL


 


MCH  30.9   (27.0-31.0)  pg


 


MCHC  34.6   (32.0-36.0)  g/dL


 


RDW  15.2 H   (11.5-14.5)  %


 


Plt Count  291   (150-300)  10^3/uL


 


MPV  7.3 L   (7.4-10.4)  fL


 


Add Manual Diff  Yes   


 


Neutrophils % (Manual)  76 H   (50-70)  %


 


Lymphocytes % (Manual)  19 L   (20-40)  %


 


Monocytes % (Manual)  3   (2-8)  %


 


Eosinophils % (Manual)  2   (1-3)  %


 


Absolute Neutrophils  7.0680   


 


Lymphocytes # (Manual)  1.7670   


 


Monocytes # (Manual)  0.2790   


 


Eosinophils # (Manual)  0.1860   


 


Giant Platelets  Occasional   


 


Sodium   135 L  (136-145)  mmol/L


 


Potassium   4.4  (3.3-5.3)  mmol/L


 


Chloride   102  ()  mmol/L


 


Carbon Dioxide   21.0  (21.0-32.0)  mmol/L


 


BUN   39 H  (6-25)  mg/dL


 


Creatinine   2.81 H  (0.51-1.17)  mg/dL


 


Est Cr Clr Drug Dosing   27.62  mL/min


 


Estimated GFR (MDRD)   23  mL/min


 


Glucose   108  ()  mg/dL


 


Calcium   8.2 L  (8.7-10.3)  mg/dL


 


Magnesium   2.2  (1.8-2.4)  mg/dL


 


Total Bilirubin   0.3  (0.2-1.0)  mg/dL


 


AST   13 L  (15-37)  U/L


 


ALT   18  (12-78)  U/L


 


Alkaline Phosphatase   99  ()  IU/L


 


Total Protein   6.5  (6.4-8.2)  g/dL


 


Albumin   2.43 L  (3.00-4.80)  g/dL











Result Diagrams: 


 07/03/18 07:15





 07/04/18 07:45


Problem List Initiated/Reviewed/Updated: Yes


Orders Last 24hrs: 


 Active Orders 24 hr











 Category Date Time Status


 


 Patient Status [ADT] Routine ADT  07/03/18 03:38 Ordered


 


 Oxygen Therapy [RC] PRN Care  07/03/18 03:38 Active


 


 Vital Signs [RC] 0300,0700,1100,1500,1900,2300 Care  07/03/18 03:38 Active


 


 Heart Healthy Diet [DIET] Diet  07/03/18 Breakfast Active


 


 UA W/MICROSCOPIC [URIN] AM Lab  07/03/18 07:15 Ordered


 


 Sodium Chloride 0.9% [Normal Saline] 1,000 ml Med  07/03/18 03:45 Active





 IV ASDIRECTED   


 


 Sodium Chloride 0.9% [Syrex Flush] Med  07/03/18 02:46 Active





 5 ml FLUSH Q8HR PRN   


 


 Peripheral IV Insertion Adult [OM.PC] Routine Oth  07/03/18 02:46 Ordered


 


 Resuscitation Status Routine Resus Stat  07/03/18 03:38 Ordered








 Medication Orders





Sodium Chloride (Normal Saline)  1,000 mls @ 125 mls/hr IV ASDIRECTED CARLOS


   Last Admin: 07/03/18 04:31  Dose: 125 mls/hr


Sodium Chloride (Syrex Flush)  5 ml FLUSH Q8HR PRN


   PRN Reason: Keep Vein Open








Assessment/Plan Comment:: 


HISTORY OF PRESENT ILLNESS


This 67-year-old was admitted through the ED ongoing acute on chronic weakness 

and pain immobility in his lower extremities which patient's wife is concerned 

regarding her ability to care for the patient. She was recently discharged from 

[facility] for similar symptoms and underwent extensive testing including 

MRI lumbar spine, brain, cervical spine all unrevealing. He underwent 

neurological consultation and thought to be possible fictitious the patient was 

evaluated by PT and OT and home health was recommended. He does have follow-up 

with neurology this month to include EMG studies








Pertinent ED workup


Hypotension, improved with isotonic IV hydration


Elevated renal indices


Hypoalbuminemia


_________________________________________________________________





Primary impression


Hypotension, likely r/t low vascular volume


Dehydration, prerenal,


JUDIE, prerenal


Weakness, lower extremity,


Multiple high risk medications


Tobacco dependency 


Opioid dependent








Secondary problems


PVD


HTN


Neuropathy


WILLIAM, on iron


Depression


BPH,


MSK, facet joint syndrome, DDD, on COT (14 MME) currently under pain management 

contract [place]. 





Overall plan, continue with hydration, place in acute status do to JUDIE, 

hypotension, dehydration, Social service consultation, Pharmacy consultation.  

PT consult, Begin medication washout as patient on multiple high risk 

medications, will hold all NSAIDs, tizanidine, Benadryl, finasteride and flomax

, HCTZ, ACE inhibitor, and B1a, reduce opiates by 50% and assess function 

level.  change Tylenol to scheduled.

## 2018-07-04 LAB
ANION GAP SERPL CALC-SCNC: 15.9 MMOL/L (ref 5–15)
CHLORIDE SERPL-SCNC: 103 MMOL/L (ref 98–115)
SODIUM SERPL-SCNC: 136 MMOL/L (ref 136–145)

## 2018-07-04 NOTE — PN
07/04/2018 PATIENT NAME:  JYOTI REDDY

 

HPI:  This is a 67-year-old male patient who was admitted to inpatient services

through the emergency room.  He presented with hypotension and chronic weakness

and pain in his right lower extremity.  It is noted he did wake up during the

night and felt that his immobility was compromised due to the lower extremity

weakness and pain  therefore was brought to the emergency room and admitted.

He has recently been discharged from [facility] in [place] for similar

symptoms.  During the course of his [place] hospitalization, an MRI of the lumbar

spine, brain, and cervical spine were completed, all unrevealing.  He did 
undergo

a neurological consultation and thought to be possible factitious.  He was

evaluated by Physical therapy there and OT.  Home health were recommended.

Today he states that he continues to experience some lower extremity discomfort.

He is compliant with his medications as ordered.  During the night, he did have

a Palomino catheter inserted due to urinary retention of 800+ mL per bladder scan.

Previously late afternoon/early evening a bladder scan was completed due to no 
void since

admission and over 300 mL had been identified.  At that time, he was straight

cathed.  He did have his Proscar and tamsulosin placed on hold during the

hospital admit process. Currently, he is receiving sodium chloride IV solutions

at 125 mL/h.

 

REVIEW OF SYSTEMS:  HEENT:  Negative.

LUNGS:  He has no complaints of shortness of breath, cough, or congestion.

CARDIOVASCULAR:  He has no chest pain or palpitations.

GI:  His appetite has been good, although he states he prefers small meals more

frequently due to his history of bariatric surgery.  There has been no nausea or

vomiting.

EXTREMITIES:  Notes the right lower extremity pain and discomfort.

:  Inability to void since hospital admission.

 

PHYSICAL EXAMINATION:  HEENT:  Hearing is intact.  Pupils equal, round, reactive

to light and accommodation.

LUNGS:  Clear to auscultation without wheezing, rhonchi, or rales.

CARDIOVASCULAR:  Heart rate and rhythm is regular, S1 and S2.

ABDOMEN:  Soft, nontender, bowel sounds are present.

EXTREMITIES:  Noted no lower extremity edema.  Pedal pulses present.

SKIN:  Warm and dry to the touch.

 

IMPRESSION AND PLAN:  Hypotension, right lower extremity discomfort, elevated

renal indices. urinary retention since hospital admission resulting in straight 
cathing and palomino catheter.

 

SECONDARY IMPRESSION:  PVD, neuropathy, depression, BPH.

 

PLAN:  The patient's IV will be decreased to 75 mL/h as he has improved his oral

intake of fluids.  We are going to restart his Proscar 5 mg and his tamsulosin

0.4 mg on a daily basis.  Palomino cath will be left intact today and possibly

removed tomorrow.  He does have a physical therapy evaluation, which is

scheduled for later today. His neuro consult in [place] is scheduled for next

Tuesday and  has been given a referral for possible placement.

Noted that his BUN is improving from 39 to 36 today.  Creatinine also shows an

improvement at 2.81 to 1.84 today.  Sodium yesterday was 135 and it is 136

today.  He did have a UA completed which indicates bacteria and white cells of 5

to 10.  The patient is asymptomatic.  We will continue to assess the patient and

if he initiates any complaints of UTI symptoms, a followup UA will be obtained.

His blood pressure and vital signs today are 136/69, heart rate remains in the

50s with O2 saturation in the 90s.

Case discussed with Dr. Maldonado.

 

DD:  07/04/2018 11:27:05

DT:  07/04/2018 12:05:35

Job #:  [id number]

MTDD

## 2018-07-05 NOTE — PCM.PN
- General Info


Date of Service: 07/05/18


Functional Status: Reports: Pain Controlled, Tolerating Diet, Ambulating, 

Incentive Spirometry.  Denies: New Symptoms





- Review of Systems


General: Reports: Weakness


HEENT: Reports: No Symptoms


Pulmonary: Reports: No Symptoms


Cardiovascular: Reports: No Symptoms


Gastrointestinal: Reports: No Symptoms


Genitourinary: Reports: Retention


Musculoskeletal: Reports: Leg Pain


Skin: Reports: Dryness


Neurological: Reports: Pre-Existing Deficit





- Patient Data


Vitals - Most Recent: 


 Last Vital Signs











Temp  98.2 F   07/05/18 14:36


 


Pulse  55 L  07/05/18 20:18


 


Resp  16   07/05/18 14:36


 


BP  174/79 H  07/05/18 20:18


 


Pulse Ox  98   07/05/18 14:36











Weight - Most Recent: 181 lb 4 oz


I&O - Last 24 Hours: 


 Intake & Output











 07/05/18 07/05/18 07/05/18





 06:59 14:59 22:59


 


Intake Total 854 1421 


 


Output Total 1000 325 


 


Balance -146 1096 











Med Orders - Current: 


 Current Medications





Acetaminophen (Tylenol)  325 - 650 mg PO Q4H [facility]


   Last Admin: 07/05/18 20:18 Dose:  650 mg


Duloxetine HCl (Cymbalta)  30 mg PO ACBREAKFAST [facility]


   Last Admin: 07/05/18 08:20 Dose:  30 mg


Duloxetine HCl (Cymbalta)  60 mg PO BEDTIME [facility]


   Last Admin: 07/05/18 20:17 Dose:  60 mg


Ferrous Sulfate (Ferrous Sulfate)  325 mg PO DAILY [facility]


   Last Admin: 07/05/18 08:20 Dose:  325 mg


Finasteride (Proscar)  5 mg PO DAILY [facility]


   Last Admin: 07/05/18 08:23 Dose:  5 mg


Gabapentin (Neurontin)  600 mg PO DAILY [facility]


   Last Admin: 07/05/18 08:20 Dose:  600 mg


Hydrochlorothiazide (Hydrochlorothiazide)  12.5 mg PO DAILY [facility]


   Last Admin: 07/05/18 08:59 Dose:  12.5 mg


Sodium Chloride (Normal Saline)  1,000 mls @ 75 mls/hr IV ASDIRECTED [facility]


   Last Admin: 07/05/18 16:08 Dose:  75 mls/hr


Metoprolol Succinate (Toprol Xl)  50 mg PO BID [facility]


   Last Admin: 07/05/18 20:18 Dose:  50 mg


Oxycodone HCl (Oxycodone)  2.5 mg PO BID PRN


   PRN Reason: pain: see note below


   Last Admin: 07/05/18 04:31 Dose:  2.5 mg


Oxymetazoline HCl (Afrin Original 0.05% Nasal Spray)  0 ml NASBOTH BID PRN


   PRN Reason: Rhinitis


Polyethylene Glycol (Miralax)  17 gm PO DAILY [facility]


   Last Admin: 07/05/18 08:18 Dose:  17 gm


Ranitidine HCl (Zantac)  150 mg PO DAILY [facility]


   Last Admin: 07/05/18 08:21 Dose:  150 mg


Sodium Chloride (Syrex Flush)  5 ml FLUSH Q8HR PRN


   PRN Reason: Keep Vein Open


Tamsulosin HCl (Flomax)  0.4 mg PO DAILY [facility]


   Last Admin: 07/05/18 08:23 Dose:  0.4 mg


Trazodone HCl (Trazodone)  100 mg PO BEDTIME [facility]


   Last Admin: 07/05/18 20:18 Dose:  100 mg





Discontinued Medications





Gabapentin (Neurontin)  300 mg PO TID [facility]


   Stop: 07/03/18 22:00


   Last Admin: 07/03/18 21:15 Dose:  300 mg


Sodium Chloride (Normal Saline)  1,000 mls @ 999 mls/hr IV .BOLUS ONE


   Stop: 07/03/18 03:46


   Last Admin: 07/03/18 02:58 Dose:  999 mls/hr


Sodium Chloride (Normal Saline)  1,000 mls @ 125 mls/hr IV ASDIRECTED [facility]


   Last Admin: 07/04/18 04:45 Dose:  125 mls/hr


Ketorolac Tromethamine (Toradol)  30 mg IM ONETIME ONE


   Stop: 07/03/18 02:47


   Last Admin: 07/03/18 02:58 Dose:  30 mg


Ranitidine HCl (Zantac)  150 mg PO BIDAC [facility]











- Exam


Quality Assessment: No: Supplemental Oxygen


General: Alert, Oriented


Neck: Supple


Lungs: Clear to Auscultation, Normal Respiratory Effort


Cardiovascular: Regular Rate, Regular Rhythm


GI/Abdominal Exam: Soft.  No: Distended


Extremities: No: Pedal Edema


Skin: Dry, Intact


Neurological: Normal Speech, Normal Tone


Psy/Mental Status: Alert, Normal Affect, Normal Mood





- Problem List Review


Problem List Initiated/Reviewed/Updated: Yes





- My Orders


Last 24 Hours: 


My Active Orders





07/05/18 09:00


Hydrochlorothiazide   12.5 mg PO DAILY 


Metoprolol Succinate [Toprol XL]   50 mg PO BID 





07/05/18 11:17


Vital Signs [RC] 0700,1500,2300 














- Plan


Plan:: 


HISTORY OF PRESENT ILLNESS


This 67-year-old was admitted through the ED ongoing acute on chronic weakness 

and pain immobility in his lower extremities which patient's wife is concerned 

regarding her ability to care for the patient. She was recently discharged from 

[facility] for similar symptoms and underwent extensive testing including 

MRI lumbar spine, brain, cervical spine all unrevealing. He underwent 

neurological consultation and thought to be possible fictitious the patient was 

evaluated by PT and OT and home health was recommended. He does have follow-up 

with neurology this month to include EMG studies








Pertinent ED workup


Hypotension, improved with isotonic IV hydration


Elevated renal indices


Hypoalbuminemia


_________________________________________________________________





Primary impression


Hypotension, likely r/t low vascular volume


Dehydration, prerenal,


JUDIE, prerenal


Weakness, lower extremity,


Multiple high risk medications


Tobacco dependency 


Opioid dependent








Secondary problems


PVD


HTN


Neuropathy


WILLIAM, on iron


Depression


BPH,


MSK, facet joint syndrome, DDD, on COT (14 MME) currently under pain management 

contract [facility]. 





Overall plan, continue with hydration, becoming euvolemic with adequate PO, 

cont with Acute status, Social service consultation, Pharmacy consultation.   

Now back on BPH meds, Remove palomino and TWOC today, Restart home BP med of HCTZ 

and BB, however cont holding ACEI for now. Tylenol to scheduled.  PT evaluation 

suggest patient would benefit from SNF/therapy services d/t high risk of 

readmission.

## 2018-07-06 VITALS — DIASTOLIC BLOOD PRESSURE: 66 MMHG | SYSTOLIC BLOOD PRESSURE: 139 MMHG

## 2018-07-06 NOTE — DISCH
He was admitted into initially observation on 07/03/2018, changed to inpatient

on 07/03/2018, and discharged from inpatient on 07/06/2018.

 

FINAL DIAGNOSES:  Acute kidney injury improved, hypotension, prerenal, improved,

dehydration improved, weakness in lower extremity somewhat improved, multiple

high-risk medications, tobacco dependency, opioid dependence.

 

HISTORY:  This 67-year-old gentleman was initially admitted to the ED due to

ongoing acute on chronic weakness and pain and decreased mobility due to pain

and weakness in his lower extremities.  The patient's wife was concerned

regarding her ability to care for the patient.  He was recently discharged from

[facility] for similar symptoms, underwent extensive testing including MRI

of the lumbar, brain, and cervical spine, all unrevealing.  He underwent

neurological consultation, and his condition was thought to be possible

factitious.  He was evaluated by PT here and OT.  The patient will be discharged

to a long-term care center for rehabilitation.  He does have a followup for

Neurology in the end July to include EMG study.

 

HOSPITAL COURSE:  The patient's hospital course went fairly well.  He was washed

out on many of his medications including NSAIDs, tizanidine, Benadryl, HCTZ, ACE

inhibitors, beta blockers, all due to low blood pressure and increased weakness.

He did have urinary retention requiring a bladder scan and subsequent

catheterization.  His BPH medications were restarted, and he was able to

successfully void after a trial without catheterization.  His oxycodone 5 mg

p.o. b.i.d. was tapered to discontinue.  He never became hemodynamically

unstable.  Blood pressure came up quite nicely after IV fluids enough to where

he was started back on many of his medications including beta blocker and HCTZ

and his BPH medications; however, ACE inhibitor is still on hold.  His renal

indices improved when he came in to the hospital.  He had a BUN of 39,

creatinine 2.81; on discharge BUN 36, creatinine 1.84.  Physical Therapy had

seen the patient and evaluated him, felt like he could benefit from either a

swing bed or skilled nursing facility or home PT.

 

PHYSICAL EXAM ON DISCHARGE:  VITAL SIGNS:  Temperature 97.8, blood pressure

139/66, heart rate 50, O2 sats 97% as on room air, and respiratory rate 18.

LUNGS:  His lungs are clear to auscultation.

CV:  Regular rate and rhythm.

:  He was voiding spontaneously.

GI:  No abdominal distention.  Good bowel tones.

EXTREMITIES:  He did have some pain in his lower extremities upon the slightest

touch, allodynia.

 

MEDICATION ADJUSTMENTS:  Oxycodone discontinued; Zantac 150 mg decreased from

b.i.d. to daily due to renal insufficiency; and we will hold ACE inhibitors for

now.  He can continue on all other home medications. Tylenol changed to

scheduled.

 

DISPOSITION:  The patient will be transferred and discharged from [facility]

to [facility] for skilled Nursing and

Rehabilitation.  See a provider at next available appointment.  Consider

restarting ACE inhibitors, BMP.

 

DD:  07/06/2018 09:50:56

DT:  07/06/2018 12:13:34

Job #:  [id number]

## 2018-07-21 ENCOUNTER — HOSPITAL ENCOUNTER (INPATIENT)
Dept: HOSPITAL 77 - KA.ED | Age: 68
LOS: 3 days | Discharge: HOME HEALTH | DRG: 74 | End: 2018-07-24
Attending: NURSE PRACTITIONER | Admitting: NURSE PRACTITIONER
Payer: MEDICARE

## 2018-07-21 DIAGNOSIS — R33.9: ICD-10-CM

## 2018-07-21 DIAGNOSIS — F17.200: ICD-10-CM

## 2018-07-21 DIAGNOSIS — Z88.8: ICD-10-CM

## 2018-07-21 DIAGNOSIS — H02.409: ICD-10-CM

## 2018-07-21 DIAGNOSIS — G89.29: ICD-10-CM

## 2018-07-21 DIAGNOSIS — G90.9: ICD-10-CM

## 2018-07-21 DIAGNOSIS — N17.9: ICD-10-CM

## 2018-07-21 DIAGNOSIS — R26.81: ICD-10-CM

## 2018-07-21 DIAGNOSIS — E86.0: ICD-10-CM

## 2018-07-21 DIAGNOSIS — F41.8: ICD-10-CM

## 2018-07-21 DIAGNOSIS — G71.0: ICD-10-CM

## 2018-07-21 DIAGNOSIS — M54.9: ICD-10-CM

## 2018-07-21 DIAGNOSIS — I95.9: ICD-10-CM

## 2018-07-21 DIAGNOSIS — G62.89: Primary | ICD-10-CM

## 2018-07-21 DIAGNOSIS — K59.00: ICD-10-CM

## 2018-07-21 DIAGNOSIS — I10: ICD-10-CM

## 2018-07-21 DIAGNOSIS — K21.9: ICD-10-CM

## 2018-07-21 DIAGNOSIS — R53.1: ICD-10-CM

## 2018-07-21 DIAGNOSIS — G25.3: ICD-10-CM

## 2018-07-21 DIAGNOSIS — N40.0: ICD-10-CM

## 2018-07-21 DIAGNOSIS — D50.9: ICD-10-CM

## 2018-07-21 DIAGNOSIS — Z79.899: ICD-10-CM

## 2018-07-21 LAB
ANION GAP SERPL CALC-SCNC: 23.3 MMOL/L (ref 5–15)
CHLORIDE SERPL-SCNC: 96 MMOL/L (ref 98–115)
SODIUM SERPL-SCNC: 137 MMOL/L (ref 136–145)

## 2018-07-21 PROCEDURE — G0378 HOSPITAL OBSERVATION PER HR: HCPCS

## 2018-07-21 PROCEDURE — 80048 BASIC METABOLIC PNL TOTAL CA: CPT

## 2018-07-21 PROCEDURE — 87086 URINE CULTURE/COLONY COUNT: CPT

## 2018-07-21 PROCEDURE — 81001 URINALYSIS AUTO W/SCOPE: CPT

## 2018-07-21 PROCEDURE — 85025 COMPLETE CBC W/AUTO DIFF WBC: CPT

## 2018-07-21 PROCEDURE — 83880 ASSAY OF NATRIURETIC PEPTIDE: CPT

## 2018-07-21 PROCEDURE — 36415 COLL VENOUS BLD VENIPUNCTURE: CPT

## 2018-07-21 PROCEDURE — 99285 EMERGENCY DEPT VISIT HI MDM: CPT

## 2018-07-21 NOTE — EDM.PDOC
ED HPI GENERAL MEDICAL PROBLEM





- General


Chief Complaint: General


Stated Complaint: LEGS ARE NUMB


Time Seen by Provider: 07/21/18 21:00


Source of Information: Reports: Family (wife), Old Records


History Limitations: Reports: Altered Mental Status





- History of Present Illness


INITIAL COMMENTS - FREE TEXT/NARRATIVE: 





67-year-old male is brought in by emergency EMS with complaints of severe 

weakness pain numbness and tingling in his lower extremities and inability for 

him to avoid. He has had recent multiple ER visits with similar complaints. He'

s had a thorough workup at [facility] for these symptoms and underwent 

extensive testing including MRI of the lumbar cervical spine as well as brain 

all unrevealing. He underwent neurological consultation and his condition was 

thought to be possible factitious. He was recently admitted to the nursing home 

for the last 2 weeks and was doing evidently well. He was discharged to home 

yesterday. Per the patient's wife she states that he slept for nearly 16 hours 

and upon waking up complained of severe weakness in his lower legs and 

inability to void. He now presents with just essentially moaning and is unable 

to give a significant history upon asking questions. His wife states that she 

cannot explain his condition. She did state that every time that he has acute 

renal failure his symptoms seem to worsen. Upon arrival the nurse did a bladder 

scan and he was found to have 500 mL of urine in his bladder he was cathetered 

and a Maldonado was placed. He has a long history of chronic pain management and 

was on at one time 80 mg of OxyContin twice a day. He is been weaned off the 

narcotics. His wife states that he has not been nauseated or vomiting but has 

not seen him drink any fluids today. He is nontoxic appearing and his vital 

signs are stable.


Onset: Today


Onset Date: 07/21/18


Duration: Hour(s):


Location: Reports: Generalized, Radiates to (lower extremities)


Quality: Reports: Burning


Severity: Severe


Improves with: Reports: None


Worsens with: Reports: None


Associated Symptoms: Reports: Weakness





- Related Data


 Allergies











Allergy/AdvReac Type Severity Reaction Status Date / Time


 


adhesive Allergy  Rash Verified 07/21/18 20:51


 


bacitracin Allergy  Rash Verified 07/21/18 20:51





[From Neosporin     





([id number])]     


 


bacitracin zinc Allergy  Rash Verified 07/21/18 20:51





[From Neosporin     





([id number])]     


 


neomycin sulfate Allergy  Rash Verified 07/21/18 20:51





[From Neosporin     





([id number])]     


 


polymyxin B Allergy  Rash Verified 07/21/18 20:51





[From Neosporin     





([id number])]     











Home Meds: 


 Home Meds





DULoxetine [Cymbalta] 60 mg PO BEDTIME 08/27/15 [History]


Polyethylene Glycol 3350 [MiraLAX] 17 gm PO DAILY 08/27/15 [History]


tiZANidine [Zanaflex] 4 - 8 mg PO Q4H PRN 08/27/15 [History]


traZODone 100 mg PO BEDTIME 08/27/15 [History]


Gabapentin [Neurontin] 300 mg PO TID 10/12/17 [History]


Metoprolol Succinate [Toprol Xl] 50 mg PO BID #0 10/12/17 [Rx]


hydroCHLOROthiazide [Hydrochlorothiazide] 12.5 mg PO DAILY 07/03/18 [History]


Acetaminophen [Tylenol] 650 mg PO Q6H #180 tablet 07/06/18 [Rx]


DULoxetine HCl [Duloxetine HCl] 60 mg PO BEDTIME #30 capsule. 07/06/18 [Rx]


DULoxetine [Cymbalta] 30 mg PO ACBREAKFAST #30 cap 07/06/18 [Rx]


Ferrous Sulfate 325 mg PO DAILY #30 tablet 07/06/18 [Rx]


Finasteride [Proscar] 5 mg PO DAILY #30 tablet 07/06/18 [Rx]


Ipratropium Bromide 2 sprays NASBOTH BID PRN #1 spray 07/06/18 [Rx]


Lisinopril 40 mg PO DAILY #60 tablet 07/06/18 [Rx]


Metoprolol Succinate [Toprol XL 50mg] 50 mg PO BID #60 tab.er 07/06/18 [Rx]


Polyethylene Glycol 3350 [MiraLAX] 17 gm PO DAILY #30 packet 07/06/18 [Rx]


Ranitidine [Zantac] 150 mg PO DAILY #30 sdv 07/06/18 [Rx]


Tamsulosin [Flomax] 0.4 mg PO DAILY #30 cap.er 07/06/18 [Rx]


Tamsulosin [Flomax] 0.4 mg PO DAILY #30 cap.er 07/06/18 [Rx]


hydroCHLOROthiazide [Hydrochlorothiazide] 12.5 mg PO DAILY #30 cap 07/06/18 [Rx]











Past Medical History


HEENT History: Reports: Cataract, Hard of Hearing, Impaired Vision, Other (See 

Below)


Other HEENT History: ocular muscular dystrophy


Cardiovascular History: Reports: Hypertension


Respiratory History: Reports: Pneumonia, Recurrent, Other (See Below)


Other Respiratory History: heavy smoker


Gastrointestinal History: Reports: Chronic Constipation, GERD, Other (See Below)


Other Gastrointestinal History: ulcer


Genitourinary History: Reports: Prostate Disorder


Musculoskeletal History: Reports: Back Pain, Chronic, Muscular Dystrophy


Neurological History: Reports: None


Psychiatric History: Reports: Anxiety, Depression





- Infectious Disease History


Infectious Disease History: Reports: Measles, Mumps





- Past Surgical History


HEENT Surgical History: Reports: None


Cardiovascular Surgical History: Reports: None


GI Surgical History: Reports: Appendectomy, Cholecystectomy, Colonoscopy, 

Nissen Fundoplication


Neurological Surgical History: Reports: Discectomy


Dermatological Surgical History: Reports: Skin Graft





Social & Family History





- Family History


Family Medical History: Noncontributory





- Caffeine Use


Caffeine Use: Reports: None





ED ROS GENERAL





- Review of Systems


Review Of Systems: Unable To Obtain





ED EXAM, GENERAL





- Physical Exam


Exam: See Below


General Appearance: Moderate Distress


Eye Exam: Bilateral Eye: PERRL


Nose: Normal Inspection


Throat/Mouth: Normal Inspection, Normal Voice, No Airway Compromise


Head: Atraumatic


Neck: Normal Inspection, Supple


Respiratory/Chest: No Respiratory Distress, Lungs Clear


Cardiovascular: Normal Peripheral Pulses, Regular Rate, Rhythm


GI/Abdominal: Normal Bowel Sounds, Soft, Non-Tender


Back Exam: Normal Inspection


Extremities: Normal Inspection, Normal Range of Motion, No Pedal Edema, Other (

weakness throughout all extremities)


Neurological: Inattentive, Slow to Respond, Sensory/Motor Deficit


Psychiatric: Depressed Mood, Tearful


Skin Exam: Warm, Dry, Intact, Normal Color, No Rash


Lymphatic: No Adenopathy





Course





- Vital Signs


Last Recorded V/S: 





 Last Vital Signs











Temp  99.5 F   07/21/18 20:51


 


Pulse  72   07/21/18 20:51


 


Resp  18   07/21/18 20:51


 


BP  130/66   07/21/18 20:51


 


Pulse Ox  94 L  07/21/18 20:51














- Orders/Labs/Meds


Orders: 





 Active Orders 24 hr











 Category Date Time Status


 


 URINALYSIS W/MICROSCOPIC [UA W/MICROSCOPIC] [URIN] Stat Lab  07/21/18 21:15 

Ordered


 


 Sodium Chloride 0.9% [Normal Saline] 1,000 ml Med  07/21/18 22:08 Active





 IV .BOLUS   








 Medication Orders





Acetaminophen (Tylenol)  650 mg PO Q4H PRN


   PRN Reason: analgesia/fever


Acetaminophen (Tylenol)  650 mg PO Q6H CARL[facility]


Duloxetine HCl (Cymbalta)  30 mg PO ACBREAKFAST CARLOS


Ferrous Sulfate (Ferrous Sulfate)  325 mg PO DAILY CARLOS


Finasteride (Proscar)  5 mg PO DAILY CARLOS


Gabapentin (Neurontin)  300 mg PO TID CARLOS


Hydrochlorothiazide (Hydrochlorothiazide)  12.5 mg PO DAILY CARLOS


Hydrochlorothiazide (Hydrochlorothiazide)  12.5 mg PO DAILY CARLOS


Sodium Chloride (Normal Saline)  1,000 mls @ 70 mls/hr IV .BOLUS ONE


   Stop: 07/22/18 12:25


Lisinopril (Prinivil)  40 mg PO DAILY [facility]


Metoprolol Succinate (Toprol Xl)  50 mg PO BID CARLOS


Metoprolol Succinate (Toprol Xl)  50 mg PO BID CARLOS


Non-Formulary Medication (Duloxetine Hcl [Duloxetine Hcl])  60 mg PO BEDTIME CARLOS


Non-Formulary Medication (Duloxetine [Cymbalta])  60 mg PO BEDTIME CARLOS


Non-Formulary Medication (Ipratropium Bromide)  2 sprays NASBOTH BID PRN


   PRN Reason: Rhinitis


Non-Formulary Medication (Ranitidine [Zantac])  150 mg PO DAILY CARLOS


Non-Formulary Medication (Trazodone)  100 mg PO BEDTIME CARLOS


Polyethylene Glycol (Miralax)  17 gm PO DAILY CARLOS


Polyethylene Glycol (Miralax)  17 gm PO DAILY CARLOS


Tamsulosin HCl (Flomax)  0.4 mg PO DAILY [facility]


Tamsulosin HCl (Flomax)  0.4 mg PO DAILY [facility]


Tizanidine HCl (Zanaflex)  4 - 8 mg PO Q4H PRN


   PRN Reason: Muscle Spasm








Labs: 





 Laboratory Tests











  07/21/18 07/21/18 07/21/18 Range/Units





  21:15 21:25 21:25 


 


WBC   10.2 H   (5.0-10.0)  10^3/uL


 


RBC   4.11 L   (4.50-6.00)  10^6/uL


 


Hgb   12.2 L   (13.0-17.0)  g/dL


 


Hct   37.4 L   (40.0-52.0)  %


 


MCV   90.9   (82.0-92.0)  fL


 


MCH   29.8   (27.0-31.0)  pg


 


MCHC   32.7   (32.0-36.0)  g/dL


 


RDW   14.7 H   (11.5-14.5)  %


 


Plt Count   295   (150-300)  10^3/uL


 


MPV   7.1 L   (7.4-10.4)  fL


 


Neut % (Auto)   83.3 H   (50.0-70.0)  %


 


Lymph % (Auto)   9.4 L   (20.0-40.0)  %


 


Mono % (Auto)   5.3   (2.0-8.0)  %


 


Eos % (Auto)   2.0   (1.0-3.0)  %


 


Baso % (Auto)   0.0   (0.0-1.0)  %


 


Neut # (Auto)   8.5 H   (2.5-7.0)  10^3/uL


 


Lymph # (Auto)   1.0   (1.0-4.0)  10^3/uL


 


Mono # (Auto)   0.5   (0.1-0.8)  10^3/uL


 


Eos # (Auto)   0.2   (0.1-0.3)  10^3/uL


 


Baso # (Auto)   0.0   (0.0-0.1)  10^3/uL


 


Sodium    137  (136-145)  mmol/L


 


Potassium    5.1  (3.3-5.3)  mmol/L


 


Chloride    96 L  ()  mmol/L


 


Carbon Dioxide    22.8  (21.0-32.0)  mmol/L


 


Anion Gap    23.3 H  (5-15)  mmol/L


 


BUN    61 H* D  (6-25)  mg/dL


 


Creatinine    2.58 H  (0.51-1.17)  mg/dL


 


Est Cr Clr Drug Dosing    29.89  mL/min


 


Estimated GFR (MDRD)    25  mL/min


 


Glucose    100  mg/dL


 


Calcium    8.8  (8.7-10.3)  mg/dL


 


B-Natriuretic Peptide     (0-100)  pg/mL


 


Specimen Type  Urincath    


 


Urine Color  Yellow    (YELLOW)  


 


Urine Appearance  Slightly cloudy H    (CLEAR)  


 


Urine pH  5.0    (5.0-9.0)  


 


Ur Specific Gravity  1.025    (1.005-1.030)  


 


Urine Protein  30 H    (NEGATIVE)  mg/dL


 


Urine Glucose (UA)  Negative    (NEGATIVE)  mg/dL


 


Urine Ketones  Negative    (NEGATIVE)  mg/dL


 


Urine Occult Blood  Large H    (NEGATIVE)  


 


Urine Nitrite  Negative    (NEGATIVE)  


 


Urine Bilirubin  Small H    (NEGATIVE)  


 


Urine Urobilinogen  0.2    (0.2-1.0)  E.U./dL


 


Ur Leukocyte Esterase  Negative    (NEGATIVE)  


 


Urine RBC  0-5    /HPF


 


Urine WBC  0-5    /HPF


 


Ur Epithelial Cells  Few    /LPF


 


Amorphous Sediment  Moderate H    (0/HPF)  /HPF


 


Urine Bacteria  Moderate H    (NONE TO FEW)  /HPF














  07/21/18 Range/Units





  21:25 


 


WBC   (5.0-10.0)  10^3/uL


 


RBC   (4.50-6.00)  10^6/uL


 


Hgb   (13.0-17.0)  g/dL


 


Hct   (40.0-52.0)  %


 


MCV   (82.0-92.0)  fL


 


MCH   (27.0-31.0)  pg


 


MCHC   (32.0-36.0)  g/dL


 


RDW   (11.5-14.5)  %


 


Plt Count   (150-300)  10^3/uL


 


MPV   (7.4-10.4)  fL


 


Neut % (Auto)   (50.0-70.0)  %


 


Lymph % (Auto)   (20.0-40.0)  %


 


Mono % (Auto)   (2.0-8.0)  %


 


Eos % (Auto)   (1.0-3.0)  %


 


Baso % (Auto)   (0.0-1.0)  %


 


Neut # (Auto)   (2.5-7.0)  10^3/uL


 


Lymph # (Auto)   (1.0-4.0)  10^3/uL


 


Mono # (Auto)   (0.1-0.8)  10^3/uL


 


Eos # (Auto)   (0.1-0.3)  10^3/uL


 


Baso # (Auto)   (0.0-0.1)  10^3/uL


 


Sodium   (136-145)  mmol/L


 


Potassium   (3.3-5.3)  mmol/L


 


Chloride   ()  mmol/L


 


Carbon Dioxide   (21.0-32.0)  mmol/L


 


Anion Gap   (5-15)  mmol/L


 


BUN   (6-25)  mg/dL


 


Creatinine   (0.51-1.17)  mg/dL


 


Est Cr Clr Drug Dosing   mL/min


 


Estimated GFR (MDRD)   mL/min


 


Glucose   mg/dL


 


Calcium   (8.7-10.3)  mg/dL


 


B-Natriuretic Peptide  90  (0-100)  pg/mL


 


Specimen Type   


 


Urine Color   (YELLOW)  


 


Urine Appearance   (CLEAR)  


 


Urine pH   (5.0-9.0)  


 


Ur Specific Gravity   (1.005-1.030)  


 


Urine Protein   (NEGATIVE)  mg/dL


 


Urine Glucose (UA)   (NEGATIVE)  mg/dL


 


Urine Ketones   (NEGATIVE)  mg/dL


 


Urine Occult Blood   (NEGATIVE)  


 


Urine Nitrite   (NEGATIVE)  


 


Urine Bilirubin   (NEGATIVE)  


 


Urine Urobilinogen   (0.2-1.0)  E.U./dL


 


Ur Leukocyte Esterase   (NEGATIVE)  


 


Urine RBC   /HPF


 


Urine WBC   /HPF


 


Ur Epithelial Cells   /LPF


 


Amorphous Sediment   (0/HPF)  /HPF


 


Urine Bacteria   (NONE TO FEW)  /HPF











Meds: 





Medications











Generic Name Dose Route Start Last Admin





  Trade Name Freq  PRN Reason Stop Dose Admin


 


Acetaminophen  650 mg  07/21/18 22:50  





  Tylenol  PO   





  Q4H PRN   





  analgesia/fever   





     





     





     


 


Acetaminophen  650 mg  07/21/18 23:00  





  Tylenol  PO   





  Q6H [facility]   





     





     





     





     


 


Duloxetine HCl  30 mg  07/22/18 07:30  





  Cymbalta  PO   





  ACBREAKFAST [facility]   





     





     





     





     


 


Ferrous Sulfate  325 mg  07/22/18 09:00  





  Ferrous Sulfate  PO   





  DAILY [facility]   





     





     





     





     


 


Finasteride  5 mg  07/22/18 09:00  





  Proscar  PO   





  DAILY [facility]   





     





     





     





     


 


Gabapentin  300 mg  07/22/18 09:00  





  Neurontin  PO   





  TID [facility]   





     





     





     





     


 


Hydrochlorothiazide  12.5 mg  07/22/18 09:00  





  Hydrochlorothiazide  PO   





  DAILY [facility]   





     





     





     





     


 


Hydrochlorothiazide  12.5 mg  07/22/18 09:00  





  Hydrochlorothiazide  PO   





  DAILY [facility]   





     





     





     





     


 


Sodium Chloride  1,000 mls @ 70 mls/hr  07/21/18 22:08  





  Normal Saline  IV  07/22/18 12:25  





  .BOLUS ONE   





     





     





     





     


 


Lisinopril  40 mg  07/22/18 09:00  





  Prinivil  PO   





  DAILY [facility]   





     





     





     





     


 


Metoprolol Succinate  50 mg  07/22/18 09:00  





  Toprol Xl  PO   





  BID [facility]   





     





     





     





     


 


Metoprolol Succinate  50 mg  07/22/18 09:00  





  Toprol Xl  PO   





  BID [facility]   





     





     





     





     


 


Non-Formulary Medication  60 mg  07/22/18 21:00  





  Duloxetine Hcl [Duloxetine Hcl]  PO   





  BEDTIME [facility]   





     





     





     





     


 


Non-Formulary Medication  60 mg  07/22/18 21:00  





  Duloxetine [Cymbalta]  PO   





  BEDTIME [facility]   





     





     





     





     


 


Non-Formulary Medication  2 sprays  07/21/18 22:51  





  Ipratropium Bromide  NASBOTH   





  BID PRN   





  Rhinitis   





     





     





     


 


Non-Formulary Medication  150 mg  07/22/18 09:00  





  Ranitidine [Zantac]  PO   





  DAILY [facility]   





     





     





     





     


 


Non-Formulary Medication  100 mg  07/22/18 21:00  





  Trazodone  PO   





  BEDTIME [facility]   





     





     





     





     


 


Polyethylene Glycol  17 gm  07/22/18 09:00  





  Miralax  PO   





  DAILY [facility]   





     





     





     





     


 


Polyethylene Glycol  17 gm  07/22/18 09:00  





  Miralax  PO   





  DAILY [facility]   





     





     





     





     


 


Tamsulosin HCl  0.4 mg  07/22/18 09:00  





  Flomax  PO   





  DAILY [facility]   





     





     





     





     


 


Tamsulosin HCl  0.4 mg  07/22/18 09:00  





  Flomax  PO   





  DAILY [facility]   





     





     





     





     


 


Tizanidine HCl  4 - 8 mg  07/21/18 22:51  





  Zanaflex  PO   





  Q4H PRN   





  Muscle Spasm   





     





     





     














Discontinued Medications














Generic Name Dose Route Start Last Admin





  Trade Name Freq  PRN Reason Stop Dose Admin


 


Sodium Chloride  Confirm  07/21/18 22:09  





  Normal Saline  Administered  07/21/18 22:10  





  Dose   





  1,000 mls @ as directed   





  .ROUTE   





  .STK-MED ONE   





     





     





     





     














Departure





- Departure


Time of Disposition: 23:06


Disposition: Refer to Observation


Condition: Fair


Clinical Impression: 


 Acute kidney injury (nontraumatic), Weakness of both lower extremities








- Discharge Information





- My Orders


Last 24 Hours: 





My Active Orders





07/21/18 21:15


URINALYSIS W/MICROSCOPIC [UA W/MICROSCOPIC] [URIN] Stat 





07/21/18 22:08


Sodium Chloride 0.9% [Normal Saline] 1,000 ml IV .BOLUS 














- Assessment/Plan


Last 24 Hours: 





My Active Orders





07/21/18 21:15


URINALYSIS W/MICROSCOPIC [UA W/MICROSCOPIC] [URIN] Stat 





07/21/18 22:08


Sodium Chloride 0.9% [Normal Saline] 1,000 ml IV .BOLUS 











Assessment:: 





Acute renal failure/injury


Bilateral lower extremity weakness acute on chronic


History of chronic pain syndrome


Plan: 





Patient will be admitted for acute renal injury. Placed in observation. Maninder Ramirez was notified and will except patient. Normal saline at 75 mL an hour 

and Maldonado was placed for close I&O monitoring.

## 2018-07-22 LAB
ANION GAP SERPL CALC-SCNC: 15.8 MMOL/L (ref 5–15)
CHLORIDE SERPL-SCNC: 100 MMOL/L (ref 98–115)
SODIUM SERPL-SCNC: 135 MMOL/L (ref 136–145)

## 2018-07-22 RX ADMIN — Medication SCH MG: at 08:35

## 2018-07-22 NOTE — PCM.HP
H&P History of Present Illness





- General


Date of Service: 18


Admit Problem/Dx: 


 Admission Diagnosis/Problem





Admission Diagnosis/Problem      Renal failure








Source of Information: Patient, Family, , Old Records, Provider


History Limitations: Reports: No Limitations





- History of Present Illness


Initial Comments - Free Text/Narative: 





This 67-year-old gentleman with recent multiple admissions and extensive 

workups in the past, who was released at [facility] on  and 

subsequently transferred to [facility] was brought in by family 

members back to the ED due to weakness in his lower extremities, pain, numbness 

and tingling in his lower extremities and acute urinary retention. He was 

released from the nursing home . 





Patient spouse (who is employed at the same NH he resided) was told he met all 

PT/OT goals and was doing quite well there as he was ambulating, drinking fluids

, voiding spontaneously and he was released.  She stated he became very 

fatigued as usual when he has appointments in [place]--came home from recent 

neurology appointment, slept 18 hours without eating or drinking anything and 

they noticed that he could not void spontaneously prompting ED visit.





Zion has had extensive evaluation/imaging and workup including MRI lumbar 

spine, brain, cervical spine all unrevealing. He underwent neurological 

consultation and thought to be possible fictitious.  He has a long history of 

chronic pain management and was on at one time 80 mg of OxyContin twice a day. 

He is been weaned off the narcotics. 


  ** Bilateral Lower Leg


Pain Score (Numeric/FACES): 4





- Related Data


Allergies/Adverse Reactions: 


 Allergies











Allergy/AdvReac Type Severity Reaction Status Date / Time


 


adhesive Allergy  Rash Verified 18 20:51


 


bacitracin Allergy  Rash Verified 18 20:51





[From Neosporin     





([id number])]     


 


bacitracin zinc Allergy  Rash Verified 18 20:51





[From Neosporin     





([id number])]     


 


neomycin sulfate Allergy  Rash Verified 18 20:51





[From Neosporin     





([id number])]     


 


polymyxin B Allergy  Rash Verified 18 20:51





[From Neosporin     





([id number])]     











Home Medications: 


 Home Meds





DULoxetine [Cymbalta] 60 mg PO BEDTIME 08/27/15 [History]


tiZANidine [Zanaflex] 4 - 8 mg PO Q4H PRN 08/27/15 [History]


traZODone 100 mg PO BEDTIME 08/27/15 [History]


Gabapentin [Neurontin] 300 mg PO 1200 10/12/17 [History]


Acetaminophen [Tylenol] 650 mg PO Q6H #180 tablet 18 [Rx]


DULoxetine [Cymbalta] 30 mg PO ACBREAKFAST #30 cap 18 [Rx]


Ferrous Sulfate 325 mg PO DAILY #30 tablet 18 [Rx]


Finasteride [Proscar] 5 mg PO DAILY #30 tablet 18 [Rx]


Ipratropium Bromide 2 sprays NASBOTH BID PRN #1 spray 18 [Rx]


Lisinopril 40 mg PO DAILY #60 tablet 18 [Rx]


Metoprolol Succinate [Toprol XL 50mg] 50 mg PO BID #60 tab.er 18 [Rx]


Polyethylene Glycol 3350 [MiraLAX] 17 gm PO DAILY #30 packet 18 [Rx]


hydroCHLOROthiazide [Hydrochlorothiazide] 12.5 mg PO DAILY #30 cap 18 [Rx]


Cetirizine HCl [Zyrtec] 10 mg PO BEDTIME 18 [History]


Ranitidine HCl [Ranitidine] 150 mg PO ACBREAKFAST 18 [History]


Gabapentin [Neurontin] 300 mg PO BID #60 capsule 18 [Rx]


Tamsulosin [Flomax] 0.8 mg PO DAILY #60 cap.er 18 [Rx]











Past Medical History


HEENT History: Reports: Cataract, Hard of Hearing, Impaired Vision, Other (See 

Below)


Other HEENT History: ocular muscular dystrophy


Cardiovascular History: Reports: Hypertension


Respiratory History: Reports: Pneumonia, Recurrent, Other (See Below)


Other Respiratory History: heavy smoker


Gastrointestinal History: Reports: Chronic Constipation, GERD, Other (See Below)


Other Gastrointestinal History: ulcer


Genitourinary History: Reports: Prostate Disorder


Musculoskeletal History: Reports: Back Pain, Chronic, Muscular Dystrophy


Neurological History: Reports: None


Psychiatric History: Reports: Anxiety, Depression





- Infectious Disease History


Infectious Disease History: Reports: Measles, Mumps





- Past Surgical History


HEENT Surgical History: Reports: None


Cardiovascular Surgical History: Reports: None


GI Surgical History: Reports: Appendectomy, Cholecystectomy, Colonoscopy, 

Nissen Fundoplication


Neurological Surgical History: Reports: Discectomy


Dermatological Surgical History: Reports: Skin Graft





Social & Family History





- Family History


HEENT: Reports: None


Cardiac: Reports: Hypertension (Sister hypertension, 2 brothers with brother 

hypertension, 1 brother had stroke), PVD/COD (Father had peripheral vascular 

disease), Other (See Below) (Father  PVD, pulmonary embolus,)


Respiratory: Reports: None


GI: Reports: PUD (Brother with peptic ulcer disease,)


: Reports: None


OBGYN: Reports: None


Musculoskeletal: Reports: None, Muscular Dystrophy (Mother, maternal aunt, 

maternal uncle, brother all with with muscular dystrophy)


Neurological: Reports: None, CVA (Brother had stroke,)


Psychiatric: Reports: None


Endocrine/Metabolic: Reports: None


Hematologic: Reports: Other (See Below) (Brother unknown type of thrombophilia)


Immunologic: Reports: None


Dermatologic: Reports: None


Oncologic: Reports: None





- Tobacco Use


Smoking Status *Q: Current Every Day Smoker


Years of Tobacco use: 1


Packs/Tins Daily: 1





- Caffeine Use


Caffeine Use: Reports: None





H&P Review of Systems





- Review of Systems:


Review Of Systems: See Below


General: Reports: Malaise, Weakness, Fatigue.  Denies: Fever, Chills, Night 

Sweats, Diaphoresis, Decreased Appetite, Weight Loss


HEENT: Reports: No Symptoms


Pulmonary: Reports: No Symptoms


Cardiovascular: Reports: No Symptoms


Gastrointestinal: Reports: Constipation.  Denies: Distension, Melena, Vomiting


Genitourinary: Reports: Retention


Musculoskeletal: Reports: Leg Pain, Muscle Pain


Skin: Reports: Dryness


Psychiatric: Reports: Mood Lability.  Denies: Confusion, Anxiety, Agitation


Neurological: Reports: Pre-Existing Deficit, Difficulty Walking, Weakness, Gait 

Disturbance.  Denies: Confusion, Dizziness, Seizure, Syncope, Change in Speech


Hematologic/Lymphatic: Reports: Anemia


Immunologic: Reports: No Symptoms





Exam





- Exam


Exam: See Below





- Vital Signs


Vital Signs: 


 Last Vital Signs











Temp  99.2 F   18 06:27


 


Pulse  74   18 08:28


 


Resp  16   18 06:27


 


BP  98/56 L  18 08:30


 


Pulse Ox  95   18 06:27











Weight: 180 lb 9.6 oz





- Exam


Quality Assessment: Supplemental Oxygen


General: Alert, Oriented, Cooperative.  No: Mild Distress


HEENT: EACs Clear, Hearing Intact, Pupils Reactive


Neck: Supple


Lungs: Clear to Auscultation, Normal Respiratory Effort


Cardiovascular: Regular Rate, Regular Rhythm


GI/Abdominal Exam: Soft


 (Male) Exam: Normal Prostate.  No: Hernia


Rectal (Males) Exam: Prostate Normal.  No: Fecal Impaction


Back Exam: No: CVA Tenderness (L), CVA Tenderness (R)


Extremities: No Pedal Edema


Skin: Dry.  No: Rash


Neurological: Normal Speech, Sensation Intact, Abnormal Gait.  No: Focal Deficit

, Babinski Absent


Neuro Extensive - Mental Status: Alert, Oriented x3, Normal Cognition, Slow 

Response to Commands


Neuro Extensive - Motor, Sensory, Reflexes: Abnormal Heel to [place], Other (

Dysesthesias lower extremities, no foot drop), Motor/Sensory Deficits.  No: 

Tongue Deviation (L), Tongue Deviation (R), Dysarthria, Receptive Aphasia, 

Expressive Aphasia, Total Aphasia, Facial palsy (L), Facial Palsy (R), Facial 

Palsy w Forehead, Hemeplagia (R), Hemeplagia (L), Pronator Drift (R), Pronator 

Drift (L), Tremor


Psychiatric: Alert, Labile Mood.  No: Depressed, Agitated





- Patient Data


Lab Results Last 24 hrs: 


 Laboratory Results - last 24 hr











  18 Range/Units





  21:15 21:25 21:25 


 


WBC   10.2 H   (5.0-10.0)  10^3/uL


 


RBC   4.11 L   (4.50-6.00)  10^6/uL


 


Hgb   12.2 L   (13.0-17.0)  g/dL


 


Hct   37.4 L   (40.0-52.0)  %


 


MCV   90.9   (82.0-92.0)  fL


 


MCH   29.8   (27.0-31.0)  pg


 


MCHC   32.7   (32.0-36.0)  g/dL


 


RDW   14.7 H   (11.5-14.5)  %


 


Plt Count   295   (150-300)  10^3/uL


 


MPV   7.1 L   (7.4-10.4)  fL


 


Neut % (Auto)   83.3 H   (50.0-70.0)  %


 


Lymph % (Auto)   9.4 L   (20.0-40.0)  %


 


Mono % (Auto)   5.3   (2.0-8.0)  %


 


Eos % (Auto)   2.0   (1.0-3.0)  %


 


Baso % (Auto)   0.0   (0.0-1.0)  %


 


Neut # (Auto)   8.5 H   (2.5-7.0)  10^3/uL


 


Lymph # (Auto)   1.0   (1.0-4.0)  10^3/uL


 


Mono # (Auto)   0.5   (0.1-0.8)  10^3/uL


 


Eos # (Auto)   0.2   (0.1-0.3)  10^3/uL


 


Baso # (Auto)   0.0   (0.0-0.1)  10^3/uL


 


Sodium    137  (136-145)  mmol/L


 


Potassium    5.1  (3.3-5.3)  mmol/L


 


Chloride    96 L  ()  mmol/L


 


Carbon Dioxide    22.8  (21.0-32.0)  mmol/L


 


Anion Gap    23.3 H  (5-15)  mmol/L


 


BUN    61 H* D  (6-25)  mg/dL


 


Creatinine    2.58 H  (0.51-1.17)  mg/dL


 


Est Cr Clr Drug Dosing    29.89  mL/min


 


Estimated GFR (MDRD)    25  mL/min


 


Glucose    100  mg/dL


 


Calcium    8.8  (8.7-10.3)  mg/dL


 


B-Natriuretic Peptide     (0-100)  pg/mL


 


Specimen Type  Urincath    


 


Urine Color  Yellow    (YELLOW)  


 


Urine Appearance  Slightly cloudy H    (CLEAR)  


 


Urine pH  5.0    (5.0-9.0)  


 


Ur Specific Gravity  1.025    (1.005-1.030)  


 


Urine Protein  30 H    (NEGATIVE)  mg/dL


 


Urine Glucose (UA)  Negative    (NEGATIVE)  mg/dL


 


Urine Ketones  Negative    (NEGATIVE)  mg/dL


 


Urine Occult Blood  Large H    (NEGATIVE)  


 


Urine Nitrite  Negative    (NEGATIVE)  


 


Urine Bilirubin  Small H    (NEGATIVE)  


 


Urine Urobilinogen  0.2    (0.2-1.0)  E.U./dL


 


Ur Leukocyte Esterase  Negative    (NEGATIVE)  


 


Urine RBC  0-5    /HPF


 


Urine WBC  0-5    /HPF


 


Ur Epithelial Cells  Few    /LPF


 


Amorphous Sediment  Moderate H    (0/HPF)  /HPF


 


Urine Bacteria  Moderate H    (NONE TO FEW)  /HPF














  18 Range/Units





  21:25 07:25 


 


WBC    (5.0-10.0)  10^3/uL


 


RBC    (4.50-6.00)  10^6/uL


 


Hgb    (13.0-17.0)  g/dL


 


Hct    (40.0-52.0)  %


 


MCV    (82.0-92.0)  fL


 


MCH    (27.0-31.0)  pg


 


MCHC    (32.0-36.0)  g/dL


 


RDW    (11.5-14.5)  %


 


Plt Count    (150-300)  10^3/uL


 


MPV    (7.4-10.4)  fL


 


Neut % (Auto)    (50.0-70.0)  %


 


Lymph % (Auto)    (20.0-40.0)  %


 


Mono % (Auto)    (2.0-8.0)  %


 


Eos % (Auto)    (1.0-3.0)  %


 


Baso % (Auto)    (0.0-1.0)  %


 


Neut # (Auto)    (2.5-7.0)  10^3/uL


 


Lymph # (Auto)    (1.0-4.0)  10^3/uL


 


Mono # (Auto)    (0.1-0.8)  10^3/uL


 


Eos # (Auto)    (0.1-0.3)  10^3/uL


 


Baso # (Auto)    (0.0-0.1)  10^3/uL


 


Sodium   135 L  (136-145)  mmol/L


 


Potassium   5.0  (3.3-5.3)  mmol/L


 


Chloride   100  ()  mmol/L


 


Carbon Dioxide   24.2  (21.0-32.0)  mmol/L


 


Anion Gap   15.8 H  (5-15)  mmol/L


 


BUN   48 H  (6-25)  mg/dL


 


Creatinine   2.00 H  (0.51-1.17)  mg/dL


 


Est Cr Clr Drug Dosing   40.96  mL/min


 


Estimated GFR (MDRD)   33  mL/min


 


Glucose   110  mg/dL


 


Calcium   9.0  (8.7-10.3)  mg/dL


 


B-Natriuretic Peptide  90   (0-100)  pg/mL


 


Specimen Type    


 


Urine Color    (YELLOW)  


 


Urine Appearance    (CLEAR)  


 


Urine pH    (5.0-9.0)  


 


Ur Specific Gravity    (1.005-1.030)  


 


Urine Protein    (NEGATIVE)  mg/dL


 


Urine Glucose (UA)    (NEGATIVE)  mg/dL


 


Urine Ketones    (NEGATIVE)  mg/dL


 


Urine Occult Blood    (NEGATIVE)  


 


Urine Nitrite    (NEGATIVE)  


 


Urine Bilirubin    (NEGATIVE)  


 


Urine Urobilinogen    (0.2-1.0)  E.U./dL


 


Ur Leukocyte Esterase    (NEGATIVE)  


 


Urine RBC    /HPF


 


Urine WBC    /HPF


 


Ur Epithelial Cells    /LPF


 


Amorphous Sediment    (0/HPF)  /HPF


 


Urine Bacteria    (NONE TO FEW)  /HPF











Result Diagrams: 


 18 07:10





 18 07:10


Problem List Initiated/Reviewed/Updated: Yes


Orders Last 24hrs: 


 Active Orders 24 hr











 Category Date Time Status


 


 Patient Status [ADT] Routine ADT  18 22:43 Ordered


 


 Bedrest Bedside Commode [RC] ASDIRECTED Care  18 22:43 Active


 


 Bladder Scan [RC] ASDIRECTED Care  18 20:50 Active


 


 Maldonado Catheter Insertion [Insert Urinary Catheter] [OM. Care  18 20:45 

Ordered





 PC] Q24H   


 


 Maldonado Catheter Insertion [Insert Urinary Catheter] [OM. Care  18 20:45 

Ordered





 PC] Q24H   


 


 Height and Weight [RC] 0700 Care  18 22:43 Active


 


 Notify Provider Vital Signs [RC] ASDIRECTED Care  18 22:48 Active


 


 Oxygen Therapy [RC] PRN Care  18 22:47 Active


 


 Pulse Oximetry [RC] PRN Care  18 22:47 Active


 


 Urinary Catheter Assessment [RC] ASDIRECTED Care  18 09:52 Active


 


 Vital Signs [RC] 0300,0700,1100,1500,1900,2300 Care  18 22:43 Active


 


 URINALYSIS W/MICROSCOPIC [UA W/MICROSCOPIC] [URIN] Stat Lab  18 21:15 

Ordered


 


 DULoxetine [Cymbalta] Med  18 07:30 Active





 30 mg PO ACBREAKFAST   


 


 DULoxetine [Cymbalta] Med  18 21:00 Active





 60 mg PO BEDTIME   


 


 Ferrous Sulfate Med  18 09:00 Active





 325 mg PO DAILY   


 


 Finasteride [Proscar] Med  18 09:00 Active





 5 mg PO DAILY   


 


 Ipratropium Bromide Med  18 22:51 Active





 2 sprays NASBOTH BID PRN   


 


 Lisinopril [Prinivil] Med  18 09:00 Active





 40 mg PO DAILY   


 


 Metoprolol Succinate [Toprol XL] Med  18 09:00 Active





 50 mg PO BID   


 


 Polyethylene Glycol 3350 [MiraLAX] Med  18 09:00 Active





 17 gm PO DAILY   


 


 Ranitidine [Zantac] Med  18 09:00 Active





 150 mg PO DAILY   


 


 Sodium Chloride 0.9% [Normal Saline] 1,000 ml Med  18 22:08 Active





 IV .BOLUS   


 


 Tamsulosin [Flomax] Med  18 09:00 Active





 0.4 mg PO DAILY   


 


 hydroCHLOROthiazide Med  18 09:00 Active





 12.5 mg PO DAILY   


 


 tiZANidine [Zanaflex] Med  18 22:51 Active





 4 - 8 mg PO Q4H PRN   


 


 traZODone Med  18 21:00 Active





 100 mg PO BEDTIME   








 Medication Orders





Duloxetine HCl (Cymbalta)  30 mg PO ACBREAKFAST [facility]


   Last Admin: 18 08:27  Dose: 30 mg


Ferrous Sulfate (Ferrous Sulfate)  325 mg PO DAILY [facility]


   Last Admin: 18 08:28  Dose: 325 mg


Finasteride (Proscar)  5 mg PO DAILY [facility]


   Last Admin: 18 08:27  Dose: 5 mg


Hydrochlorothiazide (Hydrochlorothiazide)  12.5 mg PO DAILY [facility]


   Last Admin: 18 08:27  Dose: 12.5 mg


Sodium Chloride (Normal Saline)  1,000 mls @ 70 mls/hr IV .BOLUS ONE


   Stop: 18 12:25


   Last Admin: 18 23:11  Dose: 70 mls/hr


Lisinopril (Prinivil)  40 mg PO DAILY [facility]


   Last Admin: 18 08:30  Dose: 40 mg


Metoprolol Succinate (Toprol Xl)  50 mg PO BID [facility]


   Last Admin: 18 08:28  Dose: 50 mg


Non-Formulary Medication (Duloxetine [Cymbalta])  60 mg PO BEDTIME [facility]


Non-Formulary Medication (Ipratropium Bromide)  2 sprays NASBOTH BID PRN


   PRN Reason: Rhinitis


Non-Formulary Medication (Ranitidine [Zantac])  150 mg PO DAILY [facility]


   Last Admin: 18 08:35  Dose: 150 mg


Non-Formulary Medication (Trazodone)  100 mg PO BEDTIME [facility]


Polyethylene Glycol (Miralax)  17 gm PO DAILY [facility]


   Last Admin: 18 08:30  Dose: 17 gm


Tamsulosin HCl (Flomax)  0.4 mg PO DAILY [facility]


   Last Admin: 18 08:28  Dose: 0.4 mg


Tizanidine HCl (Zanaflex)  4 - 8 mg PO Q4H PRN


   PRN Reason: Muscle Spasm








Assessment/Plan Comment:: 


HISTORY OF PRESENT ILLNESS


This 67-year-old gentleman with recent multiple admissions and extensive 

workups in the past, who was released at [facility] on  and 

subsequently transferred to [facility] was brought in by family 

members back to the ED due to weakness in his lower extremities, pain, numbness 

and tingling in his lower extremities and acute urinary retention. He was 

released from the nursing home Friday, . 





Patient spouse (who is employed at the same NH he resided) was told he met all 

PT/OT goals and was doing quite well there as he was ambulating, drinking fluids

, voiding spontaneously and he was released.  She stated he became very 

fatigued as usual when he has appointments in [place]--came home from recent 

neurology appointment, slept 18 hours without eating or drinking anything and 

they noticed that he could not void spontaneously prompting ED visit.





Zion has had extensive evaluation/imaging and workup including MRI lumbar 

spine, brain, cervical spine all unrevealing. He underwent neurological 

consultation and thought to be possible fictitious.  He has a long history of 

chronic pain management and was on at one time 80 mg of OxyContin twice a day. 

He is been weaned off the narcotics. 


_____________________________________________________________





Pertinent ED findings


Bladder scan 500 mL retained


Elevated BUN/creatinine


______________________________________________________________





Consultations


Seen Dr Mason, neurology  for follow-up to evaluate for polyneuropathy

/ lumbosacral radiculopathy. EMG studies were performed which demonstrated 

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

-S1 radiculopathy could not be ruled out completely there were some doubt.


Desires see patient back in 4-6 weeks.


______________________________________________________________





Primary problems


AUR;  possible autonomic neuropathy as etiology 


JUDIE, post-obstructive 


Dehydration, 


Hypotension, likely r/t low vascular volume


Polyneuropathy, Weakness, lower extremity


Myoclonic jerks


______________________________________________________________





Chronic problems


PVD


HTN, history


WILLIAM, on iron


Depression


BPH, increase Flomax


MSK, DDD.  








Overall plan, change acute status, increase fluids, monitor renal indices, 

continue Maldonado for now due to JUDIE--monitor for postobstructive diuresis, 

Patient could be developing autonomic neuropathy as cause of his AUR;  however 

tomorrow morning will TWOC see if he can avoid spontaneously. Urine culture, 

Correct constipation, increase flomax, hold other BP medications, Hold HCTZ, SS 

consultations, family does not desire to return back to long-term care.

## 2018-07-23 LAB
ANION GAP SERPL CALC-SCNC: 17 MMOL/L (ref 5–15)
CHLORIDE SERPL-SCNC: 103 MMOL/L (ref 98–115)
SODIUM SERPL-SCNC: 139 MMOL/L (ref 136–145)

## 2018-07-23 RX ADMIN — Medication SCH: at 10:18

## 2018-07-23 NOTE — PCM.PN
- General Info


Date of Service: 07/23/18


Functional Status: Reports: Tolerating Diet.  Denies: Pain Controlled, 

Ambulating, Urinating (Indwelling Maldonado catheter)





- Review of Systems


General: Reports: Weakness.  Denies: Fever


HEENT: Reports: No Symptoms


Pulmonary: Reports: No Symptoms


Cardiovascular: Reports: No Symptoms


Gastrointestinal: Denies: Abdominal Pain, Constipation, Diarrhea, Nausea, 

Vomiting


Genitourinary: Reports: Retention


Musculoskeletal: Reports: Leg Pain


Skin: Reports: No Symptoms


Neurological: Reports: Numbness, Pre-Existing Deficit, Tingling, Difficulty 

Walking, Weakness, Gait Disturbance.  Denies: Confusion, Dizziness, Headache, 

Tremors


Psychiatric: Denies: Agitation





- Patient Data


Vitals - Most Recent: 


 Last Vital Signs











Temp  98.9 F   07/23/18 06:59


 


Pulse  57 L  07/23/18 06:59


 


Resp  16   07/23/18 06:59


 


BP  122/65   07/23/18 06:59


 


Pulse Ox  95   07/23/18 07:45











Weight - Most Recent: 181 lb 9.6 oz


I&O - Last 24 Hours: 


 Intake & Output











 07/22/18 07/23/18 07/23/18





 22:59 06:59 14:59


 


Intake Total 1984 741 


 


Output Total 775 425 


 


Balance 1209 316 











Lab Results Last 24 Hours: 


 Laboratory Results - last 24 hr











  07/23/18 Range/Units





  07:20 


 


Sodium  139  (136-145)  mmol/L


 


Potassium  4.7  (3.3-5.3)  mmol/L


 


Chloride  103  ()  mmol/L


 


Carbon Dioxide  23.7  (21.0-32.0)  mmol/L


 


Anion Gap  17.0 H  (5-15)  mmol/L


 


BUN  31 H  (6-25)  mg/dL


 


Creatinine  1.32 H  (0.51-1.17)  mg/dL


 


Est Cr Clr Drug Dosing  62.27  mL/min


 


Estimated GFR (MDRD)  54  mL/min


 


Glucose  92  mg/dL


 


Calcium  8.7  (8.7-10.3)  mg/dL











Med Orders - Current: 


 Current Medications





Acetaminophen (Tylenol)  650 mg PO ASDIRECTED [facility]


   Last Admin: 07/23/18 07:47 Dose:  650 mg


Cetirizine HCl (Zyrtec)  10 mg PO BEDTIME [facility]


   Last Admin: 07/22/18 21:38 Dose:  10 mg


Duloxetine HCl (Cymbalta)  30 mg PO ACBREAKFAST [facility]


   Last Admin: 07/23/18 07:47 Dose:  30 mg


Duloxetine HCl (Cymbalta)  60 mg PO BEDTIME [facility]


Ferrous Sulfate (Ferrous Sulfate)  325 mg PO DAILY [facility]


   Last Admin: 07/23/18 08:56 Dose:  325 mg


Finasteride (Proscar)  5 mg PO DAILY [facility]


   Last Admin: 07/23/18 08:56 Dose:  5 mg


Gabapentin (Neurontin)  300 mg PO BID [facility]


   Last Admin: 07/23/18 08:56 Dose:  300 mg


Hydrochlorothiazide (Hydrochlorothiazide)  12.5 mg PO DAILY [facility]


   Last Admin: 07/22/18 08:27 Dose:  12.5 mg


Sodium Chloride (Normal Saline)  1,000 mls @ 100 mls/hr IV ASDIRECTED [facility]


   Last Admin: 07/22/18 22:28 Dose:  100 mls/hr


Lisinopril (Prinivil)  40 mg PO DAILY [facility]


   Last Admin: 07/22/18 08:30 Dose:  40 mg


Metoprolol Succinate (Toprol Xl)  50 mg PO BID [facility]


   Last Admin: 07/22/18 08:28 Dose:  50 mg


Non-Formulary Medication (Ipratropium Bromide)  2 sprays NASBOTH BID PRN


   PRN Reason: Rhinitis


Polyethylene Glycol (Miralax)  17 gm PO DAILY [facility]


   Last Admin: 07/23/18 08:57 Dose:  17 gm


Ranitidine HCl (Zantac)  150 mg PO DAILY [facility]


   Last Admin: 07/23/18 09:03 Dose:  150 mg


Tamsulosin HCl (Flomax)  0.8 mg PO DAILY [facility]


   Last Admin: 07/23/18 08:56 Dose:  0.8 mg


Tizanidine HCl (Zanaflex)  4 - 8 mg PO Q4H PRN


   PRN Reason: Muscle Spasm


Trazodone HCl (Trazodone)  100 mg PO BEDTIME [facility]





Discontinued Medications





Acetaminophen (Tylenol)  650 mg PO Q6H [facility]


   Last Admin: 07/22/18 04:03 Dose:  650 mg


Gabapentin (Neurontin)  300 mg PO TID [facility]


Gabapentin (Neurontin)  600 mg PO BID [facility]


Hydrochlorothiazide (Hydrochlorothiazide)  12.5 mg PO DAILY [facility]


Sodium Chloride (Normal Saline)  1,000 mls @ 70 mls/hr IV .BOLUS ONE


   Stop: 07/22/18 12:25


   Last Infusion: 07/22/18 12:12 Dose:  100 mls/hr


Sodium Chloride (Normal Saline) Confirm Administered Dose 1,000 mls @ as 

directed .ROUTE .STK-MED ONE


   Stop: 07/21/18 22:10


   Last Admin: 07/21/18 23:12 Dose:  Not Given


Sodium Chloride (Normal Saline) Confirm Administered Dose 1,000 mls @ as 

directed .ROUTE .STK-MED ONE


   Stop: 07/22/18 12:06


   Last Admin: 07/22/18 12:09 Dose:  100 mls/hr


Metoprolol Succinate (Toprol Xl)  50 mg PO BID [facility]


Non-Formulary Medication (Duloxetine Hcl [Duloxetine Hcl])  60 mg PO BEDTIME [facility]


Non-Formulary Medication (Duloxetine [Cymbalta])  60 mg PO BEDTIME [facility]


   Last Admin: 07/22/18 21:34 Dose:  60 mg


Non-Formulary Medication (Ranitidine [Zantac])  150 mg PO DAILY [facility]


   Last Admin: 07/22/18 08:35 Dose:  150 mg


Non-Formulary Medication (Trazodone)  100 mg PO BEDTIME [facility]


   Last Admin: 07/22/18 21:34 Dose:  100 mg


Polyethylene Glycol (Miralax)  17 gm PO DAILY [facility]


Tamsulosin HCl (Flomax)  0.4 mg PO DAILY [facility]


Tamsulosin HCl (Flomax)  0.4 mg PO DAILY [facility]


   Last Admin: 07/22/18 08:28 Dose:  0.4 mg











- Exam


Quality Assessment: Supplemental Oxygen


General: Alert, Oriented, Cooperative, No Acute Distress


HEENT: Other (Ptosis).  No: Scleral Icterus


Neck: No JVD


Lungs: Clear to Auscultation, Normal Respiratory Effort


Cardiovascular: Regular Rate, Regular Rhythm


GI/Abdominal Exam: Soft.  No: Distended


 (Male) Exam: No: Hernia


Back Exam: No: CVA Tenderness (L), CVA Tenderness (R), Muscle Spasm


Extremities: No: Pedal Edema


Skin: Warm, Dry, Intact


Neurological: Normal Speech, Other.  No: Normal Gait, Strength Equal Bilateral, 

Reflexes Equal Bilateral, Sensation Intact (Has sensation to lower extremities 

only to light touch, little to no DTR lower extremities)


Psy/Mental Status: Alert





- Problem List Review


Problem List Initiated/Reviewed/Updated: Yes





- My Orders


Last 24 Hours: 


My Active Orders





07/22/18 13:27


Consult to  [CONS] Routine 





07/22/18 13:30


Sodium Chloride 0.9% [Normal Saline] 1,000 ml IV ASDIRECTED 





07/22/18 21:00


Cetirizine [ZyrTEC]   10 mg PO BEDTIME 


Gabapentin [Neurontin]   300 mg PO BID 





07/22/18 21:15


Acetaminophen [Tylenol]   650 mg PO ASDIRECTED 





07/23/18 09:00


Tamsulosin [Flomax]   0.8 mg PO DAILY 














- Plan


Plan:: 


HISTORY OF PRESENT ILLNESS


This 67-year-old gentleman with recent multiple admissions and extensive 

workups in the past, who was released at [facility] on July 6 and 

subsequently transferred to [facility]-term care [place] was brought in by family 

members back to the ED due to weakness in his lower extremities, pain, numbness 

and tingling in his lower extremities and acute urinary retention. He was 

released from the nursing home Friday, July 20th. 





Patient spouse (who is employed at the same NH he resided) was told he met all 

PT/OT goals and was doing quite well there as he was ambulating, drinking fluids

, voiding spontaneously and he was released.  She stated he became very 

fatigued as usual when he has appointments in [place]--came home from recent 

neurology appointment, slept 18 hours without eating or drinking anything and 

they noticed that he could not void spontaneously prompting ED visit.





Zion has had extensive evaluation/imaging and workup including MRI lumbar 

spine, brain, cervical spine all unrevealing. He underwent neurological 

consultation and thought to be possible fictitious.  He has a long history of 

chronic pain management and was on at one time 80 mg of OxyContin twice a day. 

He is been weaned off the narcotics. 


_____________________________________________________________





Pertinent ED findings


Bladder scan 500 mL retained


Elevated BUN/creatinine


______________________________________________________________





Consultations


Seen Dr Mason, neurology July 19th for follow-up to evaluate for polyneuropathy

/ lumbosacral radiculopathy. EMG studies were performed which demonstrated 

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

-S1 radiculopathy could not be ruled out completely there were some doubt.


Desires see patient back in 4-6 weeks.


______________________________________________________________





Primary problems


Axonal/demyelinating polyneuropathy


AUR;  possible autonomic neuropathy as etiology 


JUDIE, post-obstructive , much improved


Dehydration, much improved


Ptosis


Myoclonic jerks


______________________________________________________________





Chronic problems


PVD


HTN, history


WILLIAM, on iron


Depression


BPH, increase Flomax


MSK, DDD.  








Overall plan, 


remain in acute care, Neurontin decreased by 50% yesterday, scheduled Tylenol, 

decrease IV fluids, TWOC with Maldonado removal today. Patient could be developing 

autonomic neuropathy as cause of his AUR; constipation corrected, flomax, 

continue holding ACE inhibitor and BB, Hold HCTZ, SS and PT consultations, 

likely could be discharged Wednesday to home with PT.

## 2018-07-24 VITALS — DIASTOLIC BLOOD PRESSURE: 70 MMHG | SYSTOLIC BLOOD PRESSURE: 141 MMHG

## 2018-07-24 LAB
ANION GAP SERPL CALC-SCNC: 25.3 MMOL/L (ref 5–15)
CHLORIDE SERPL-SCNC: 102 MMOL/L (ref 98–115)
SODIUM SERPL-SCNC: 145 MMOL/L (ref 136–145)

## 2018-07-24 NOTE — PCM.DCSUM1
**Discharge Summary





- Hospital Course


Brief History: See next page


Diagnosis: Stroke: No





- Discharge Data


Discharge Date: 07/24/18


Discharge Disposition: Home, [facility]


Condition: Good





- Patient Summary/Data


Consults: 


 Consultations





07/22/18 13:27


Consult to  [CONS] Routine 





07/23/18 10:22


Consult to Physical Therapy [PT Evaluation and Treatment] [CONS] Routine 














- Patient Instructions


Diet: Usual Diet as Tolerated, Drink 8-10+ Glasses/Day


Activity: As Tolerated


Driving: Do Not Drive


Showering/Bathing: May Shower


Notify Provider of: Increased Pain, Nausea and/or Vomiting


Other/Special Instructions: Seek care if cannot urinate.  Report any muscle 

jerks.  Report any increase in pain.  Important to avoid constipation.  Stay 

well-hydrated in drink plenty of water.  Do not take hydrochlorothiazide (HCTZ) 

Until follow-up.  His ACE inhibitor/lisinopril is also held until follow-up.  

His gabapentin/Neurontin was decreased by 50% for now





- Discharge Plan


*PRESCRIPTION DRUG MONITORING PROGRAM REVIEWED*: No


*COPY OF PRESCRIPTION DRUG MONITORING REPORT IN PATIENT KACIE: No


Prescriptions/Med Rec: 


Gabapentin [Neurontin] 300 mg PO BID #60 capsule


Tamsulosin [Flomax] 0.8 mg PO DAILY #60 cap.er


Home Medications: 


 Home Meds





DULoxetine [Cymbalta] 60 mg PO BEDTIME 08/27/15 [History]


tiZANidine [Zanaflex] 4 - 8 mg PO Q4H PRN 08/27/15 [History]


traZODone 100 mg PO BEDTIME 08/27/15 [History]


Gabapentin [Neurontin] 300 mg PO 1200 10/12/17 [History]


Acetaminophen [Tylenol] 650 mg PO Q6H #180 tablet 07/06/18 [Rx]


DULoxetine [Cymbalta] 30 mg PO ACBREAKFAST #30 cap 07/06/18 [Rx]


Ferrous Sulfate 325 mg PO DAILY #30 tablet 07/06/18 [Rx]


Finasteride [Proscar] 5 mg PO DAILY #30 tablet 07/06/18 [Rx]


Ipratropium Bromide 2 sprays NASBOTH BID PRN #1 spray 07/06/18 [Rx]


Lisinopril 40 mg PO DAILY #60 tablet 07/06/18 [Rx]


Metoprolol Succinate [Toprol XL 50mg] 50 mg PO BID #60 tab.er 07/06/18 [Rx]


Polyethylene Glycol 3350 [MiraLAX] 17 gm PO DAILY #30 packet 07/06/18 [Rx]


hydroCHLOROthiazide [Hydrochlorothiazide] 12.5 mg PO DAILY #30 cap 07/06/18 [Rx]


Cetirizine HCl [Zyrtec] 10 mg PO BEDTIME 07/22/18 [History]


Ranitidine HCl [Ranitidine] 150 mg PO ACBREAKFAST 07/23/18 [History]


Gabapentin [Neurontin] 300 mg PO BID #60 capsule 07/24/18 [Rx]


Tamsulosin [Flomax] 0.8 mg PO DAILY #60 cap.er 07/24/18 [Rx]








Referrals: 


Maninder Ramirez, NP [Primary Care Provider] -  (Either myself or the provider 

of his choice midweek next week)





- Discharge Summary/Plan Comment


DC Time >30 min.: Yes


Discharge Summary/Plan Comment: 


Final diagnosis


Axonal/demyelinating polyneuropathy


JUDIE, post-obstructive 


BPH








Chronic problems


PVD


HTN, history


WILLIAM, 


Depression


MSK, DDD.  





_____________________________________________________________





History


This 67-year-old gentleman with recent multiple admissions and extensive 

workups in the past, who was released at [facility] on July 6 and 

subsequently transferred to long-term care [place] was brought in by family 

members back to the ED due to weakness in his lower extremities, pain, numbness 

and tingling in his lower extremities and acute urinary retention. He was 

released from the nursing home Friday, July 20th. 





Patient spouse was told he met all PT/OT goals and was doing quite well there 

as he was ambulating, drinking fluids, voiding spontaneously and he was 

released.  She stated he became very fatigued as usual when he has appointments 

in [place]--came home from recent neurology appointment, slept 16-18 hours 

without taking in any nutrition or fluids and when they got him up to the 

bathroom they notice he had difficulty voiding. They brought him into the ED 

thereafter.





Zion has had extensive evaluation/imaging and workup including MRI lumbar 

spine, brain, cervical spine all unrevealing. He underwent neurological 

consultation and thought to be possible fictitious. Patient was evaluated by Dr Mason, neurology July 19th for follow-up to evaluate for polyneuropathy/ 

lumbosacral radiculopathy. EMG studies were performed which demonstrated 

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

-S1 radiculopathy could not be ruled out completely there were some doubt.


Desires see patient back in 4-6 weeks.


_____________________________________________________________





Pertinent ED findings


Bladder scan 500 mL retained


Elevated BUN/creatinine


______________________________________________________________





Hospital course


Complicated by the fact patient could not void spontaneously subsequently 

receiving an indwelling Maldonado catheter, his constipation was corrected, Flomax 

was increased trial without catheter day prior discharge was successful in he 

voided spontaneously. Vital signs remained stable however he did have slightly 

low blood pressure on admission and significant dehydration in which his blood 

pressure medicines were held however eventually his beta blocker was restarted. 

Physical therapy did see him suggested home PT. He continue with IV fluids and 

is creatinine and BUN came down nicely baseline upon discharge 1.3 to 

creatinine. Did have a slightly elevated white count at 10.2 on admission 

however this normalized. Urine culture showed no growth. Vital signs remained 

stable no fever. He tolerated his diet. He was ambulatory with assistance upon 

discharge.  met with patient and family to decide plan


_______________________________________________________





Discharge Medications/changes/adjustments


HCTZ, lisinopril, held until follow-up


Beta blocker started back upon discharge


Gabapentin reduced by 50% to 300 mg by mouth twice a day


Tylenol scheduled


Increase Flomax to 0.8 mg by mouth daily


_____________________________________________________________





Disposition, patient will be discharged to home with nursing therapy physical 

therapy occupational therapy.  Patient does have significant potential for 

readmission due to progressive disease state thereby necessitating ongoing home 

care to help develop a home therapy program, report any increase in pain and 

symptoms in his lower extremities, provide a home safety assessment and 

instruction due to the patient's debility, gait instability and polyneuropathy.

  Patient will need ongoing physical therapy occupational therapy to assess and 

improve his strength and endurance and develop a home management program due to 

his multiple disease process such as acute urinary retention, polyneuropathy.  

Patient will be home bound due to unsteady gait, polyneuropathy, high risk for 

falls, limited strength and endurance due to muscle weakness due to his 

polyneuropathy pain in his lower extremity and ongoing fatigue as medications 

are adjusted.





Patient was given specific instructions on medication changes and adjustments, 

I will see him next week at the [facility] or provider for his preference, 

he is to report any fever, increase in pain, muscle weakness or jerks, or any 

other concern.











- Patient Data


Vitals - Most Recent: 


 Last Vital Signs











Temp  99.1 F   07/24/18 06:55


 


Pulse  65   07/24/18 08:21


 


Resp  16   07/24/18 06:55


 


BP  141/70 H  07/24/18 08:21


 


Pulse Ox  92 L  07/24/18 06:55











Weight - Most Recent: 180 lb 7 oz


I&O - Last 24 hours: 


 Intake & Output











 07/23/18 07/24/18 07/24/18





 22:59 06:59 14:59


 


Intake Total 1096 470 


 


Output Total 400 300 


 


Balance 696 170 











Lab Results - Last 24 hrs: 


 Laboratory Results - last 24 hr











  07/24/18 07/24/18 Range/Units





  07:10 07:10 


 


WBC  7.2   (5.0-10.0)  10^3/uL


 


RBC  3.70 L   (4.50-6.00)  10^6/uL


 


Hgb  11.0 L   (13.0-17.0)  g/dL


 


Hct  33.3 L   (40.0-52.0)  %


 


MCV  90.1   (82.0-92.0)  fL


 


MCH  29.6   (27.0-31.0)  pg


 


MCHC  32.9   (32.0-36.0)  g/dL


 


RDW  14.2   (11.5-14.5)  %


 


Plt Count  286   (150-300)  10^3/uL


 


MPV  7.3 L   (7.4-10.4)  fL


 


Neut % (Auto)  67.6   (50.0-70.0)  %


 


Lymph % (Auto)  15.5 L   (20.0-40.0)  %


 


Mono % (Auto)  13.6 H   (2.0-8.0)  %


 


Eos % (Auto)  2.7   (1.0-3.0)  %


 


Baso % (Auto)  0.6   (0.0-1.0)  %


 


Neut # (Auto)  4.9   (2.5-7.0)  10^3/uL


 


Lymph # (Auto)  1.1   (1.0-4.0)  10^3/uL


 


Mono # (Auto)  1.0 H   (0.1-0.8)  10^3/uL


 


Eos # (Auto)  0.2   (0.1-0.3)  10^3/uL


 


Baso # (Auto)  0.0   (0.0-0.1)  10^3/uL


 


Sodium   145  (136-145)  mmol/L


 


Potassium   4.7  (3.3-5.3)  mmol/L


 


Chloride   102  ()  mmol/L


 


Carbon Dioxide   22.4  (21.0-32.0)  mmol/L


 


Anion Gap   25.3 H  (5-15)  mmol/L


 


BUN   24  (6-25)  mg/dL


 


Creatinine   1.32 H  (0.51-1.17)  mg/dL


 


Est Cr Clr Drug Dosing   62.00  mL/min


 


Estimated GFR (MDRD)   54  mL/min


 


Glucose   105  mg/dL


 


Calcium   8.8  (8.7-10.3)  mg/dL











NICANOR Results - Last 24 hrs: 


 Microbiology











 07/22/18 00:00 Urine Culture - Preliminary





 Urine, Maldonado Cath (Indwelling)    NO GROWTH AFTER 1 DAY











Med Orders - Current: 


 Current Medications





Acetaminophen (Tylenol)  650 mg PO 0200,0800,1400,2000 [facility]


   Last Admin: 07/24/18 08:17 Dose:  650 mg


Cetirizine HCl (Zyrtec)  10 mg PO BEDTIME [facility]


   Last Admin: 07/23/18 20:13 Dose:  10 mg


Duloxetine HCl (Cymbalta)  30 mg PO ACBREAKFAST [facility]


   Last Admin: 07/24/18 07:44 Dose:  30 mg


Duloxetine HCl (Cymbalta)  60 mg PO BEDTIME [facility]


   Last Admin: 07/23/18 20:12 Dose:  60 mg


Ferrous Sulfate (Ferrous Sulfate)  325 mg PO DAILY [facility]


   Last Admin: 07/24/18 08:17 Dose:  325 mg


Finasteride (Proscar)  5 mg PO DAILY [facility]


   Last Admin: 07/24/18 08:17 Dose:  5 mg


Gabapentin (Neurontin)  300 mg PO BID [facility]


   Last Admin: 07/24/18 08:17 Dose:  300 mg


Hydrochlorothiazide (Hydrochlorothiazide)  12.5 mg PO DAILY [facility]


Sodium Chloride (Normal Saline)  1,000 mls @ 65 mls/hr IV ASDIRECTED [facility]


   Last Admin: 07/24/18 06:05 Dose:  65 mls/hr


Lisinopril (Prinivil)  40 mg PO DAILY [facility]


   Last Admin: 07/22/18 08:30 Dose:  40 mg


Metoprolol Succinate (Toprol Xl)  50 mg PO BID [facility]


   Last Admin: 07/24/18 08:21 Dose:  50 mg


Non-Formulary Medication (Ipratropium Bromide)  2 sprays NASBOTH BID PRN


   PRN Reason: Rhinitis


Polyethylene Glycol (Miralax)  17 gm PO DAILY [facility]


   Last Admin: 07/24/18 08:16 Dose:  17 gm


Ranitidine HCl (Zantac)  150 mg PO DAILY [facility]


   Last Admin: 07/24/18 08:17 Dose:  150 mg


Tamsulosin HCl (Flomax)  0.8 mg PO DAILY [facility]


   Last Admin: 07/24/18 08:17 Dose:  0.8 mg


Tizanidine HCl (Zanaflex)  4 - 8 mg PO Q4H PRN


   PRN Reason: Muscle Spasm


Trazodone HCl (Trazodone)  100 mg PO BEDTIME [facility]


   Last Admin: 07/23/18 20:13 Dose:  100 mg





Discontinued Medications





Acetaminophen (Tylenol)  650 mg PO Q6H [facility]


   Last Admin: 07/22/18 04:03 Dose:  650 mg


Acetaminophen (Tylenol)  650 mg PO ASDIRECTED [facility]


   Last Admin: 07/23/18 07:47 Dose:  650 mg


Duloxetine HCl (Cymbalta)  60 mg PO .STK-MED ONE


   Stop: 07/22/18 21:35


Gabapentin (Neurontin)  300 mg PO TID [facility]


Gabapentin (Neurontin)  600 mg PO BID [facility]


Hydrochlorothiazide (Hydrochlorothiazide)  12.5 mg PO DAILY [facility]


Hydrochlorothiazide (Hydrochlorothiazide)  12.5 mg PO DAILY [facility]


   Last Admin: 07/22/18 08:27 Dose:  12.5 mg


Sodium Chloride (Normal Saline)  1,000 mls @ 70 mls/hr IV .BOLUS ONE


   Stop: 07/22/18 12:25


   Last Infusion: 07/22/18 12:12 Dose:  100 mls/hr


Sodium Chloride (Normal Saline) Confirm Administered Dose 1,000 mls @ as 

directed .ROUTE .STK-MED ONE


   Stop: 07/21/18 22:10


   Last Admin: 07/21/18 23:12 Dose:  Not Given


Sodium Chloride (Normal Saline) Confirm Administered Dose 1,000 mls @ as 

directed .ROUTE .STK-MED ONE


   Stop: 07/22/18 12:06


   Last Admin: 07/22/18 12:09 Dose:  100 mls/hr


Sodium Chloride (Normal Saline)  1,000 mls @ 100 mls/hr IV ASDIRECTED [facility]


   Last Admin: 07/22/18 22:28 Dose:  100 mls/hr


Sodium Chloride (Normal Saline)  250 mls @ 65 mls/hr IV ASDIRECTED [facility]


   Stop: 07/23/18 14:30


Metoprolol Succinate (Toprol Xl)  50 mg PO BID [facility]


Non-Formulary Medication (Duloxetine Hcl [Duloxetine Hcl])  60 mg PO BEDTIME [facility]


Non-Formulary Medication (Duloxetine [Cymbalta])  60 mg PO BEDTIME [facility]


   Last Admin: 07/22/18 21:34 Dose:  60 mg


Non-Formulary Medication (Ranitidine [Zantac])  150 mg PO DAILY [facility]


   Last Admin: 07/23/18 10:18 Dose:  Not Given


Non-Formulary Medication (Trazodone)  100 mg PO BEDTIME [facility]


   Last Admin: 07/22/18 21:34 Dose:  100 mg


Polyethylene Glycol (Miralax)  17 gm PO DAILY [facility]


Ranitidine HCl (Zantac)  150 mg PO .STK-MED ONE


   Stop: 07/22/18 08:36


Tamsulosin HCl (Flomax)  0.4 mg PO DAILY [facility]


Tamsulosin HCl (Flomax)  0.4 mg PO DAILY [facility]


   Last Admin: 07/22/18 08:28 Dose:  0.4 mg


Trazodone HCl (Trazodone)  100 mg PO .STK-MED ONE


   Stop: 07/22/18 21:35